# Patient Record
Sex: MALE | Race: WHITE | NOT HISPANIC OR LATINO | Employment: OTHER | URBAN - NONMETROPOLITAN AREA
[De-identification: names, ages, dates, MRNs, and addresses within clinical notes are randomized per-mention and may not be internally consistent; named-entity substitution may affect disease eponyms.]

---

## 2018-04-04 ENCOUNTER — OFFICE VISIT (OUTPATIENT)
Dept: URGENT CARE | Facility: PHYSICIAN GROUP | Age: 68
End: 2018-04-04
Payer: MEDICARE

## 2018-04-04 VITALS
SYSTOLIC BLOOD PRESSURE: 128 MMHG | HEART RATE: 70 BPM | BODY MASS INDEX: 33.11 KG/M2 | TEMPERATURE: 98.1 F | HEIGHT: 74 IN | OXYGEN SATURATION: 95 % | DIASTOLIC BLOOD PRESSURE: 82 MMHG | WEIGHT: 258 LBS | RESPIRATION RATE: 16 BRPM

## 2018-04-04 DIAGNOSIS — J98.8 RTI (RESPIRATORY TRACT INFECTION): ICD-10-CM

## 2018-04-04 PROCEDURE — 99214 OFFICE O/P EST MOD 30 MIN: CPT | Performed by: FAMILY MEDICINE

## 2018-04-04 RX ORDER — AZITHROMYCIN 250 MG/1
TABLET, FILM COATED ORAL
Qty: 6 TAB | Refills: 0 | Status: SHIPPED | OUTPATIENT
Start: 2018-04-04 | End: 2019-11-13

## 2018-04-04 RX ORDER — PROMETHAZINE HYDROCHLORIDE, PHENYLEPHRINE HYDROCHLORIDE AND CODEINE PHOSPHATE 6.25; 5; 1 MG/5ML; MG/5ML; MG/5ML
10 SOLUTION ORAL EVERY 8 HOURS PRN
Qty: 280 ML | Refills: 0 | Status: SHIPPED | OUTPATIENT
Start: 2018-04-04 | End: 2018-04-14

## 2018-04-04 ASSESSMENT — ENCOUNTER SYMPTOMS
SPUTUM PRODUCTION: 0
FEVER: 0
FOCAL WEAKNESS: 0
COUGH: 1
DIZZINESS: 0
CHILLS: 0
SINUS PAIN: 1
ORTHOPNEA: 0

## 2019-11-13 VITALS — HEIGHT: 74 IN | BODY MASS INDEX: 33.13 KG/M2

## 2019-11-13 RX ORDER — ACETAMINOPHEN 500 MG
500-1000 TABLET ORAL EVERY 6 HOURS PRN
COMMUNITY
End: 2023-10-03

## 2019-11-13 SDOH — HEALTH STABILITY: MENTAL HEALTH: HOW OFTEN DO YOU HAVE 6 OR MORE DRINKS ON ONE OCCASION?: WEEKLY

## 2019-11-13 SDOH — HEALTH STABILITY: MENTAL HEALTH: HOW MANY STANDARD DRINKS CONTAINING ALCOHOL DO YOU HAVE ON A TYPICAL DAY?: 1 OR 2

## 2019-11-13 NOTE — OR NURSING
Hx and meds reviewed, pre op instructions given. Pt aware may take the listed meds morning of surgery; tylenol if needed . Anesthesia fasting guidelines reviewed with pt.

## 2019-11-15 ENCOUNTER — ANESTHESIA EVENT (OUTPATIENT)
Dept: SURGERY | Facility: MEDICAL CENTER | Age: 69
End: 2019-11-15
Payer: MEDICARE

## 2019-11-15 ENCOUNTER — ANESTHESIA (OUTPATIENT)
Dept: SURGERY | Facility: MEDICAL CENTER | Age: 69
End: 2019-11-15
Payer: MEDICARE

## 2019-11-15 ENCOUNTER — HOSPITAL ENCOUNTER (OUTPATIENT)
Facility: MEDICAL CENTER | Age: 69
End: 2019-11-15
Attending: ORTHOPAEDIC SURGERY | Admitting: ORTHOPAEDIC SURGERY
Payer: MEDICARE

## 2019-11-15 VITALS
BODY MASS INDEX: 32.49 KG/M2 | WEIGHT: 253.09 LBS | SYSTOLIC BLOOD PRESSURE: 153 MMHG | DIASTOLIC BLOOD PRESSURE: 87 MMHG | OXYGEN SATURATION: 96 % | HEART RATE: 84 BPM | RESPIRATION RATE: 16 BRPM | TEMPERATURE: 97.5 F

## 2019-11-15 PROCEDURE — 160002 HCHG RECOVERY MINUTES (STAT): Performed by: ORTHOPAEDIC SURGERY

## 2019-11-15 PROCEDURE — 500881 HCHG PACK, EXTREMITY: Performed by: ORTHOPAEDIC SURGERY

## 2019-11-15 PROCEDURE — 700105 HCHG RX REV CODE 258: Performed by: ORTHOPAEDIC SURGERY

## 2019-11-15 PROCEDURE — 160028 HCHG SURGERY MINUTES - 1ST 30 MINS LEVEL 3: Performed by: ORTHOPAEDIC SURGERY

## 2019-11-15 PROCEDURE — 700101 HCHG RX REV CODE 250: Performed by: ORTHOPAEDIC SURGERY

## 2019-11-15 PROCEDURE — C1713 ANCHOR/SCREW BN/BN,TIS/BN: HCPCS | Performed by: ORTHOPAEDIC SURGERY

## 2019-11-15 PROCEDURE — 700111 HCHG RX REV CODE 636 W/ 250 OVERRIDE (IP): Performed by: ANESTHESIOLOGY

## 2019-11-15 PROCEDURE — 160035 HCHG PACU - 1ST 60 MINS PHASE I: Performed by: ORTHOPAEDIC SURGERY

## 2019-11-15 PROCEDURE — 160022 HCHG BLOCK: Performed by: ORTHOPAEDIC SURGERY

## 2019-11-15 PROCEDURE — A6222 GAUZE <=16 IN NO W/SAL W/O B: HCPCS | Performed by: ORTHOPAEDIC SURGERY

## 2019-11-15 PROCEDURE — 160036 HCHG PACU - EA ADDL 30 MINS PHASE I: Performed by: ORTHOPAEDIC SURGERY

## 2019-11-15 PROCEDURE — 160009 HCHG ANES TIME/MIN: Performed by: ORTHOPAEDIC SURGERY

## 2019-11-15 PROCEDURE — 160046 HCHG PACU - 1ST 60 MINS PHASE II: Performed by: ORTHOPAEDIC SURGERY

## 2019-11-15 PROCEDURE — 501838 HCHG SUTURE GENERAL: Performed by: ORTHOPAEDIC SURGERY

## 2019-11-15 PROCEDURE — A9270 NON-COVERED ITEM OR SERVICE: HCPCS | Performed by: ANESTHESIOLOGY

## 2019-11-15 PROCEDURE — 700111 HCHG RX REV CODE 636 W/ 250 OVERRIDE (IP): Performed by: ORTHOPAEDIC SURGERY

## 2019-11-15 PROCEDURE — 700102 HCHG RX REV CODE 250 W/ 637 OVERRIDE(OP): Performed by: ANESTHESIOLOGY

## 2019-11-15 PROCEDURE — 700101 HCHG RX REV CODE 250: Performed by: ANESTHESIOLOGY

## 2019-11-15 PROCEDURE — 160039 HCHG SURGERY MINUTES - EA ADDL 1 MIN LEVEL 3: Performed by: ORTHOPAEDIC SURGERY

## 2019-11-15 PROCEDURE — 160048 HCHG OR STATISTICAL LEVEL 1-5: Performed by: ORTHOPAEDIC SURGERY

## 2019-11-15 PROCEDURE — 700105 HCHG RX REV CODE 258: Performed by: ANESTHESIOLOGY

## 2019-11-15 PROCEDURE — 160025 RECOVERY II MINUTES (STATS): Performed by: ORTHOPAEDIC SURGERY

## 2019-11-15 DEVICE — SUTURE ANCHOR 2.8MM: Type: IMPLANTABLE DEVICE | Site: KNEE | Status: FUNCTIONAL

## 2019-11-15 RX ORDER — GLYCOPYRROLATE 0.2 MG/ML
INJECTION INTRAMUSCULAR; INTRAVENOUS PRN
Status: DISCONTINUED | OUTPATIENT
Start: 2019-11-15 | End: 2019-11-15 | Stop reason: SURG

## 2019-11-15 RX ORDER — IPRATROPIUM BROMIDE AND ALBUTEROL SULFATE 2.5; .5 MG/3ML; MG/3ML
3 SOLUTION RESPIRATORY (INHALATION)
Status: DISCONTINUED | OUTPATIENT
Start: 2019-11-15 | End: 2019-11-15 | Stop reason: HOSPADM

## 2019-11-15 RX ORDER — SODIUM CHLORIDE, SODIUM LACTATE, POTASSIUM CHLORIDE, CALCIUM CHLORIDE 600; 310; 30; 20 MG/100ML; MG/100ML; MG/100ML; MG/100ML
INJECTION, SOLUTION INTRAVENOUS
Status: DISCONTINUED | OUTPATIENT
Start: 2019-11-15 | End: 2019-11-15 | Stop reason: SURG

## 2019-11-15 RX ORDER — LABETALOL HYDROCHLORIDE 5 MG/ML
5 INJECTION, SOLUTION INTRAVENOUS
Status: DISCONTINUED | OUTPATIENT
Start: 2019-11-15 | End: 2019-11-15 | Stop reason: HOSPADM

## 2019-11-15 RX ORDER — BUPIVACAINE HYDROCHLORIDE AND EPINEPHRINE 5; 5 MG/ML; UG/ML
INJECTION, SOLUTION EPIDURAL; INTRACAUDAL; PERINEURAL
Status: COMPLETED | OUTPATIENT
Start: 2019-11-15 | End: 2019-11-15

## 2019-11-15 RX ORDER — ROCURONIUM BROMIDE 10 MG/ML
INJECTION, SOLUTION INTRAVENOUS PRN
Status: DISCONTINUED | OUTPATIENT
Start: 2019-11-15 | End: 2019-11-15 | Stop reason: SURG

## 2019-11-15 RX ORDER — OXYCODONE HCL 5 MG/5 ML
10 SOLUTION, ORAL ORAL
Status: COMPLETED | OUTPATIENT
Start: 2019-11-15 | End: 2019-11-15

## 2019-11-15 RX ORDER — HYDROMORPHONE HYDROCHLORIDE 1 MG/ML
0.1 INJECTION, SOLUTION INTRAMUSCULAR; INTRAVENOUS; SUBCUTANEOUS
Status: DISCONTINUED | OUTPATIENT
Start: 2019-11-15 | End: 2019-11-15 | Stop reason: HOSPADM

## 2019-11-15 RX ORDER — HYDROMORPHONE HYDROCHLORIDE 1 MG/ML
0.4 INJECTION, SOLUTION INTRAMUSCULAR; INTRAVENOUS; SUBCUTANEOUS
Status: DISCONTINUED | OUTPATIENT
Start: 2019-11-15 | End: 2019-11-15 | Stop reason: HOSPADM

## 2019-11-15 RX ORDER — MEPERIDINE HYDROCHLORIDE 25 MG/ML
12.5 INJECTION INTRAMUSCULAR; INTRAVENOUS; SUBCUTANEOUS
Status: DISCONTINUED | OUTPATIENT
Start: 2019-11-15 | End: 2019-11-15 | Stop reason: HOSPADM

## 2019-11-15 RX ORDER — DEXAMETHASONE SODIUM PHOSPHATE 4 MG/ML
INJECTION, SOLUTION INTRA-ARTICULAR; INTRALESIONAL; INTRAMUSCULAR; INTRAVENOUS; SOFT TISSUE
Status: COMPLETED | OUTPATIENT
Start: 2019-11-15 | End: 2019-11-15

## 2019-11-15 RX ORDER — SODIUM CHLORIDE, SODIUM LACTATE, POTASSIUM CHLORIDE, CALCIUM CHLORIDE 600; 310; 30; 20 MG/100ML; MG/100ML; MG/100ML; MG/100ML
INJECTION, SOLUTION INTRAVENOUS CONTINUOUS
Status: DISCONTINUED | OUTPATIENT
Start: 2019-11-15 | End: 2019-11-15 | Stop reason: HOSPADM

## 2019-11-15 RX ORDER — HALOPERIDOL 5 MG/ML
1 INJECTION INTRAMUSCULAR
Status: DISCONTINUED | OUTPATIENT
Start: 2019-11-15 | End: 2019-11-15 | Stop reason: HOSPADM

## 2019-11-15 RX ORDER — OXYCODONE HCL 5 MG/5 ML
5 SOLUTION, ORAL ORAL
Status: COMPLETED | OUTPATIENT
Start: 2019-11-15 | End: 2019-11-15

## 2019-11-15 RX ORDER — HYDRALAZINE HYDROCHLORIDE 20 MG/ML
5 INJECTION INTRAMUSCULAR; INTRAVENOUS
Status: DISCONTINUED | OUTPATIENT
Start: 2019-11-15 | End: 2019-11-15 | Stop reason: HOSPADM

## 2019-11-15 RX ORDER — HYDROMORPHONE HYDROCHLORIDE 1 MG/ML
0.2 INJECTION, SOLUTION INTRAMUSCULAR; INTRAVENOUS; SUBCUTANEOUS
Status: DISCONTINUED | OUTPATIENT
Start: 2019-11-15 | End: 2019-11-15 | Stop reason: HOSPADM

## 2019-11-15 RX ORDER — LIDOCAINE HYDROCHLORIDE 20 MG/ML
INJECTION, SOLUTION EPIDURAL; INFILTRATION; INTRACAUDAL; PERINEURAL PRN
Status: DISCONTINUED | OUTPATIENT
Start: 2019-11-15 | End: 2019-11-15 | Stop reason: SURG

## 2019-11-15 RX ORDER — NEOSTIGMINE METHYLSULFATE 1 MG/ML
INJECTION, SOLUTION INTRAVENOUS PRN
Status: DISCONTINUED | OUTPATIENT
Start: 2019-11-15 | End: 2019-11-15 | Stop reason: SURG

## 2019-11-15 RX ORDER — DIPHENHYDRAMINE HYDROCHLORIDE 50 MG/ML
12.5 INJECTION INTRAMUSCULAR; INTRAVENOUS
Status: DISCONTINUED | OUTPATIENT
Start: 2019-11-15 | End: 2019-11-15 | Stop reason: HOSPADM

## 2019-11-15 RX ORDER — MIDAZOLAM HYDROCHLORIDE 1 MG/ML
1 INJECTION INTRAMUSCULAR; INTRAVENOUS
Status: DISCONTINUED | OUTPATIENT
Start: 2019-11-15 | End: 2019-11-15 | Stop reason: HOSPADM

## 2019-11-15 RX ORDER — CEFAZOLIN SODIUM 1 G/3ML
INJECTION, POWDER, FOR SOLUTION INTRAMUSCULAR; INTRAVENOUS PRN
Status: DISCONTINUED | OUTPATIENT
Start: 2019-11-15 | End: 2019-11-15 | Stop reason: SURG

## 2019-11-15 RX ORDER — ONDANSETRON 2 MG/ML
4 INJECTION INTRAMUSCULAR; INTRAVENOUS
Status: DISCONTINUED | OUTPATIENT
Start: 2019-11-15 | End: 2019-11-15 | Stop reason: HOSPADM

## 2019-11-15 RX ORDER — ROPIVACAINE HYDROCHLORIDE 5 MG/ML
INJECTION, SOLUTION EPIDURAL; INFILTRATION; PERINEURAL
Status: DISCONTINUED | OUTPATIENT
Start: 2019-11-15 | End: 2019-11-15 | Stop reason: HOSPADM

## 2019-11-15 RX ADMIN — ROCURONIUM BROMIDE 50 MG: 10 INJECTION, SOLUTION INTRAVENOUS at 13:30

## 2019-11-15 RX ADMIN — NEOSTIGMINE METHYLSULFATE 3 MG: 1 INJECTION INTRAVENOUS at 14:30

## 2019-11-15 RX ADMIN — CEFAZOLIN 2 G: 1 INJECTION, POWDER, FOR SOLUTION INTRAVENOUS at 13:33

## 2019-11-15 RX ADMIN — OXYCODONE HYDROCHLORIDE 5 MG: 5 SOLUTION ORAL at 15:17

## 2019-11-15 RX ADMIN — BUPIVACAINE HYDROCHLORIDE AND EPINEPHRINE BITARTRATE 30 ML: 5; .0091 INJECTION, SOLUTION EPIDURAL; INTRACAUDAL; PERINEURAL at 14:45

## 2019-11-15 RX ADMIN — LIDOCAINE HYDROCHLORIDE 0.5 ML: 10 INJECTION, SOLUTION INFILTRATION; PERINEURAL at 12:48

## 2019-11-15 RX ADMIN — DEXAMETHASONE SODIUM PHOSPHATE 2 MG: 4 INJECTION, SOLUTION INTRAMUSCULAR; INTRAVENOUS at 14:45

## 2019-11-15 RX ADMIN — SODIUM CHLORIDE, POTASSIUM CHLORIDE, SODIUM LACTATE AND CALCIUM CHLORIDE: 600; 310; 30; 20 INJECTION, SOLUTION INTRAVENOUS at 13:28

## 2019-11-15 RX ADMIN — PROPOFOL 200 MG: 10 INJECTION, EMULSION INTRAVENOUS at 13:30

## 2019-11-15 RX ADMIN — GLYCOPYRROLATE 0.4 MG: 0.2 INJECTION, SOLUTION INTRAMUSCULAR; INTRAVENOUS at 14:30

## 2019-11-15 RX ADMIN — SODIUM CHLORIDE, POTASSIUM CHLORIDE, SODIUM LACTATE AND CALCIUM CHLORIDE: 600; 310; 30; 20 INJECTION, SOLUTION INTRAVENOUS at 12:45

## 2019-11-15 RX ADMIN — LIDOCAINE HYDROCHLORIDE 100 MG: 20 INJECTION, SOLUTION EPIDURAL; INFILTRATION; INTRACAUDAL; PERINEURAL at 13:30

## 2019-11-15 RX ADMIN — FENTANYL CITRATE 150 MCG: 50 INJECTION, SOLUTION INTRAMUSCULAR; INTRAVENOUS at 14:47

## 2019-11-15 NOTE — ANESTHESIA PROCEDURE NOTES
Airway  Date/Time: 11/15/2019 1:31 PM  Performed by: Carlo Gomez M.D.  Authorized by: Carlo Gomez M.D.     Location:  OR  Urgency:  Elective  Difficult Airway: No    Indications for Airway Management:  Anesthesia  Spontaneous Ventilation: absent    Sedation Level:  Deep  Preoxygenated: Yes    Mask Difficulty Assessment:  1 - vent by mask  Final Airway Type:  Supraglottic airway  Final Supraglottic Airway:  Standard LMA  SGA Size:  5  Number of Attempts at Approach:  1

## 2019-11-15 NOTE — ANESTHESIA POSTPROCEDURE EVALUATION
Patient: Eric Santana    Procedure Summary     Date:  11/15/19 Room / Location:   OR  / SURGERY [facility]    Anesthesia Start:  1328 Anesthesia Stop:  1457    Procedure:  REPAIR, TENDON - FOR KNEE OPEN QUADRICEPS TENDON REPAIR AND FERNANDEZ (Right Knee) Diagnosis:  (KNEE PAIN RIGHT, RUPTURE QUADRICEPS TENDON)    Surgeon:  Beka Marr M.D. Responsible Provider:  Carlo Gomez M.D.    Anesthesia Type:  general ASA Status:  1          Final Anesthesia Type: general  Last vitals  BP   Blood Pressure : 160/85    Temp   36 °C (96.8 °F)    Pulse   Pulse: 77   Resp   12    SpO2   100 %      Anesthesia Post Evaluation    Patient location during evaluation: PACU  Patient participation: complete - patient participated  Level of consciousness: awake and alert    Airway patency: patent  Anesthetic complications: no  Cardiovascular status: hemodynamically stable  Respiratory status: acceptable  Hydration status: euvolemic    PONV: none           Nurse Pain Score: 8 (NPRS)

## 2019-11-15 NOTE — ANESTHESIA TIME REPORT
Anesthesia Start and Stop Event Times     Date Time Event    11/15/2019 1324 Ready for Procedure     1328 Anesthesia Start     1457 Anesthesia Stop        Responsible Staff  11/15/19    Name Role Begin End    Carlo Gomez M.D. Anesth 1328 1457        Preop Diagnosis (Free Text):  Pre-op Diagnosis     KNEE PAIN RIGHT, RUPTURE QUADRICEPS TENDON        Preop Diagnosis (Codes):    Post op Diagnosis  Rupture of quadriceps tendon      Premium Reason  Non-Premium    Comments:

## 2019-11-15 NOTE — OR NURSING
1545 into stage 2, awake alert, rt leg ace wrapped, able to wiggle toes and able to feel feet, cap refill q 3 sec return, states pain tolerable at this time. Able to assist with dressing. Assessment unchanged from my pre op assess other than rt leg aftercare .   1605 discharge instructions given to pt and spouse, pt up to br and able to utilize crutches proficiently to bathroom. Tolerated well. Meets criteria for dc stage 2.

## 2019-11-15 NOTE — ANESTHESIA PROCEDURE NOTES
Peripheral Block  Date/Time: 11/15/2019 2:45 PM  Performed by: Carlo Gomez M.D.  Authorized by: Carlo Gomez M.D.     Patient Location:  Post-op  Start Time:  11/15/2019 2:45 PM  End Time:  11/15/2019 2:50 PM  Reason for Block: at surgeon's request and post-op pain management    patient identified, IV checked, site marked, risks and benefits discussed, surgical consent, monitors and equipment checked, pre-op evaluation and timeout performed    Patient Position:  Supine  Prep: ChloraPrep    Monitoring:  Heart rate, continuous pulse ox and cardiac monitor  Block Region:  Lower Extremity  Lower Extremity - Block Type:  FEMORAL nerve block, Infra-Inguinal approach    Laterality:  Right  Procedures: ultrasound guided  Image captured, interpreted and electronically stored.  Local Infiltration:  Lidocaine  Strength:  1 %  Dose:  3 ml  Block Type:  Single-shot  Needle Length:  100mm  Needle Gauge:  21 G  Needle Localization:  Ultrasound guidance  Injection Assessment:  Negative aspiration for heme, no paresthesia on injection, incremental injection and local visualized surrounding nerve on ultrasound  Evidence of intravascular injection: No

## 2019-11-15 NOTE — OR NURSING
Into pre op, educated on pre op procedures and schedule of events for this day   1310 tolerated all pre op procedures well without incident. Spouse left to go eat lunch. Pt has brace at bedside for aftercare.

## 2019-11-15 NOTE — DISCHARGE INSTRUCTIONS
ACTIVITY: Rest and take it easy for the first 24 hours.  A responsible adult is recommended to remain with you during that time.  It is normal to feel sleepy.  We encourage you to not do anything that requires balance, judgment or coordination.    MILD FLU-LIKE SYMPTOMS ARE NORMAL. YOU MAY EXPERIENCE GENERALIZED MUSCLE ACHES, THROAT IRRITATION, HEADACHE AND/OR SOME NAUSEA.    FOR 24 HOURS DO NOT:  Drive, operate machinery or run household appliances.  Drink beer or alcoholic beverages.   Make important decisions or sign legal documents.    SPECIAL INSTRUCTIONS: Toe touch weight bearing with crutches. May shower with dressing covered.     DIET: To avoid nausea, slowly advance diet as tolerated, avoiding spicy or greasy foods for the first day.  Add more substantial food to your diet according to your physician's instructions.  Babies can be fed formula or breast milk as soon as they are hungry.  INCREASE FLUIDS AND FIBER TO AVOID CONSTIPATION.        FOLLOW-UP APPOINTMENT:  A follow-up appointment should be arranged with your doctor ; call to schedule.    You should CALL YOUR PHYSICIAN if you develop:  Fever greater than 101 degrees F.  Pain not relieved by medication, or persistent nausea or vomiting.  Excessive bleeding (blood soaking through dressing) or unexpected drainage from the wound.  Extreme redness or swelling around the incision site, drainage of pus or foul smelling drainage.  Inability to urinate or empty your bladder within 8 hours.  Problems with breathing or chest pain.    You should call 911 if you develop problems with breathing or chest pain.  If you are unable to contact your doctor or surgical center, you should go to the nearest emergency room or urgent care center.  Physician's telephone #: Dr Marr [phone]    If any questions arise, call your doctor.  If your doctor is not available, please feel free to call the Surgical Center at [phone].  The Center is open Monday through  Friday from 7AM to 7PM.  You can also call the HEALTH HOTLINE open 24 hours/day, 7 days/week and speak to a nurse at [phone], or toll free at [phone].    A registered nurse may call you a few days after your surgery to see how you are doing after your procedure.    MEDICATIONS: Resume taking daily medication.  Take prescribed pain medication with food.  If no medication is prescribed, you may take non-aspirin pain medication if needed.  PAIN MEDICATION CAN BE VERY CONSTIPATING.  Take a stool softener or laxative such as senokot, pericolace, or milk of magnesia if needed.    Prescription given to wife .  Last pain medication given at 3:17 pm .    If your physician has prescribed pain medication that includes Acetaminophen (Tylenol), do not take additional Acetaminophen (Tylenol) while taking the prescribed medication.    Depression / Suicide Risk    As you are discharged from this [facility] facility, it is important to learn how to keep safe from harming yourself.    Recognize the warning signs:  · Abrupt changes in personality, positive or negative- including increase in energy   · Giving away possessions  · Change in eating patterns- significant weight changes-  positive or negative  · Change in sleeping patterns- unable to sleep or sleeping all the time   · Unwillingness or inability to communicate  · Depression  · Unusual sadness, discouragement and loneliness  · Talk of wanting to die  · Neglect of personal appearance   · Rebelliousness- reckless behavior  · Withdrawal from people/activities they love  · Confusion- inability to concentrate     If you or a loved one observes any of these behaviors or has concerns about self-harm, here's what you can do:  · Talk about it- your feelings and reasons for harming yourself  · Remove any means that you might use to hurt yourself (examples: pills, rope, extension cords, firearm)  · Get professional help from the community (Mental Health, Substance  "Abuse, psychological counseling)  · Do not be alone:Call your Safe Contact- someone whom you trust who will be there for you.  · Call your local CRISIS HOTLINE 479-9405 or 784-647-2707  · Call your local Children's Mobile Crisis Response Team [place] (521) 192-1386 or www.Citelighter  · Call the toll free National Suicide Prevention Hotlines   · National Suicide Prevention Lifeline 078-306-TOZD (4802)  [facility] 800-SUICIDE (513-5150)    Peripheral Nerve Block Discharge Instructions from Same Day Surgery and Inpatient :    What to Expect - Lower Extremity  · The block may cause you to experience numbness and weakness in your hip and thigh, thigh and knee or calf and foot on the same side as your surgery  · Numbness, tingling and / or weakness are all normal. For some people, this may be an unpleasant sensation  · These issues will be resolved when the local anesthetic wears off   · You may experience numbness and tingling in your thigh on the same side as your surgery if the block medicine was injected at your groin area  · Numbness will make it difficult to walk  · You may have problems with balance and walking so be very careful   · Follow your surgeon's direction regarding weight bearing on your surgical limb  · Be very careful with your numb limb  Precautions  · The numbness may affect your balance  · Be careful when walking or moving around  · Your leg may be weak: be very careful putting weight on it  · If your surgeon did not specify a time, you should not bear weight for 24 hours  · Be sure to ask for help when you need it  · It is better to have help than to fall and hurt yourself  Pain Control  · The initial block on the day of surgery will make your extremity feel \"numb\"  · Any consecutive injection including prior to discharge from the hospital will make your extremity feel \"numb\"  · You may feel an aching or burning when the local anesthesia starts to wear off  · Take pain " pills as prescribed by your surgeon  · Call your surgeon or anesthesiologist if you do not have adequate pain control  ·

## 2019-11-15 NOTE — OR NURSING
1436 To PACU from OR via gurney, respirations spontaneous and non-labored. VSS. Icepack applied over c/d/i right knee surgical dressings. Cap refill < 3 seconds to RLE. Pt moaning. States pain is 9/10.   1440 Dr. Gomez at bedside to perform block. Time out performed. Pts VSS   1455 Pt states pain is not tolerable at this time. Pt denies nausea. Plan to give block   1515 Pt c/o 6/10 pain still at this time. VSS. Plan to medicate. See MAR   1530 No change   1545 Pt meets criteria for stage two. VSS. Pt states pain is tolerable and denies nausea. Report to Ce FAUSTIN

## 2019-11-16 NOTE — OP REPORT
DATE OF SERVICE:  11/15/2019    SURGEON:  Beka Marr MD    ASSISTANT:  None.    ANESTHESIOLOGIST:  Carlo Gomez MD    ANESTHESIA:  General anesthesia with single shot femoral nerve block.    PREOPERATIVE DIAGNOSIS:  Right quadriceps tendon rupture.    POSTOPERATIVE DIAGNOSIS:  Right quadriceps tendon rupture.    PROCEDURE PERFORMED:  Right open quadriceps tendon repair.    IMPLANTS:  Gusman and 2.8 mm Q-Fix anchor x2.    HISTORY OF PRESENT ILLNESS:  The patient is a very pleasant active 69-year-old   male who has injured his right knee several days ago.  His physical exam and   MRI demonstrated a complete tear of the quadriceps tendon.  We discussed   surgical intervention.  The patient has been n.p.o. since midnight.  He is   medically cleared by the anesthesia team.    INFORMED CONSENT:  The patient was informed of the risks, benefits, and   alternatives to planned operation.  The risks include, but not limited to   bleeding, infection, neurovascular damage, recurrent quadriceps tendon tear,   pain, stiffness, DVT, PE, MI, stroke, and death.  Advanced directives were   reviewed.  After answering all questions, the patient elected to proceed with   planned operation.  Informed consent form was singed.    DESCRIPTION OF PROCEDURE:  The patient was identified in the preoperative   holding area.  The correct procedural side and site were identified and   marked.  The patient was then brought to the operating room and transferred to   the operating table.  All bony prominences were padded.  He underwent a   general anesthesia.  A tourniquet was applied to the right upper thigh.    The right knee was then cleaned with several alcohol-soaked gauzes.  The right   lower extremity was then prepped and draped in normal standard sterile   fashion.    A procedural pause was then performed by the operating room team.  The   procedure, the patient's identity, operative side, surgical site, and the   procedure to be  performed were all verified.  Patient was given IV antibiotics   prior to incision.    The right lower extremity was elevated and tourniquet inflated to 250 mmHg.    An approximately 5-6 cm longitudinal incision was made directly over the   distal quadriceps tendon and the superior pole of the patella.  Dissection was   carried down and the overlying fascia was incised.  Immediately noted an   egress of hematoma and synovial fluid.  At this point in time, he had a   complete tear of the quadriceps tendon off the superior pole of the patella.    The tear was relatively clean and only minimal retinacular tissue on the   medial right angular site was disrupted.  I then inspected the joint, I did   note some grade II changes within the patellofemoral compartment.  The joint   was then copiously irrigated with normal saline.  I then debrided all   remaining tendon off of the superior pole of the patella and the superior pole   of the patella was debrided to a healthy bed of bleeding bone.  I then placed   2 Smith and Nephew 2.8 mm keep Q-Fix anchors on the superior pole of the   patella.  I then secured the distal quadriceps tendon with a single Rome   stitch and single horizontal mattress stitch from each anchor.  The sutures   were then sequentially tied with the knee in full extension.  There was   excellent reduction of the quadriceps tendon back to his footprint on the   superior pole of the patella.  I then closed gently bent the knee to about   35-40 degrees and noted no significant gapping of the repair site.  I then   repaired the medial and lateral retinacular tears with interrupted #2   FiberWire sutures.  The tourniquet was then released with total tourniquet   time of 28 minutes.  Wound was then copiously irrigated.  Meticulous   hemostasis was obtained.  The wound was closed in layered fashion with   interrupted 2-0 Monocryl, followed by staples.  A sterile compressive dressing   was then applied followed  by a SONJA hose stocking and hinged knee brace locked   in full extension.    Needle and sponge counts were correct at the end of the procedure by the   circulating nurse.  The patient has no complications.  The patient was then   transferred off the operating room table onto the hospital bed.  He was   extubated by the anesthesia team.    ESTIMATED BLOOD LOSS:  25 mL    COMPLICATIONS:  None.    TOURNIQUET TIME:  28 minutes.    SPECIMENS:  None.    WOUND TYPE:  Type 1, clean.    POSTOPERATIVE PLAN:  The patient will be transferred back to the postoperative   unit.  I expect he will be discharged from the hospital later this afternoon   once mobilizing safely and tolerating all medications.  He will remain   touchdown weightbearing on the right lower extremity with the use of crutches   and a brace in place locked in full extension.  We will begin some physical   therapy in the next 7-10 days.  The patient will take aspirin for   pharmacological DVT prophylaxis.       ____________________________________     MD LARISSA Norris / AMARIS    DD:  11/15/2019 14:39:02  DT:  11/15/2019 16:29:45    D#:  [id number]  Job#:  [id number]

## 2023-10-03 ENCOUNTER — HOSPITAL ENCOUNTER (EMERGENCY)
Facility: MEDICAL CENTER | Age: 73
End: 2023-10-03
Attending: EMERGENCY MEDICINE | Admitting: HOSPITALIST
Payer: MEDICARE

## 2023-10-03 ENCOUNTER — APPOINTMENT (OUTPATIENT)
Dept: RADIOLOGY | Facility: MEDICAL CENTER | Age: 73
End: 2023-10-03
Attending: EMERGENCY MEDICINE
Payer: MEDICARE

## 2023-10-03 ENCOUNTER — OFFICE VISIT (OUTPATIENT)
Dept: URGENT CARE | Facility: CLINIC | Age: 73
End: 2023-10-03
Payer: MEDICARE

## 2023-10-03 ENCOUNTER — APPOINTMENT (OUTPATIENT)
Dept: RADIOLOGY | Facility: IMAGING CENTER | Age: 73
End: 2023-10-03
Attending: PHYSICIAN ASSISTANT
Payer: MEDICARE

## 2023-10-03 VITALS
HEART RATE: 70 BPM | RESPIRATION RATE: 20 BRPM | HEIGHT: 74 IN | TEMPERATURE: 98.2 F | DIASTOLIC BLOOD PRESSURE: 78 MMHG | OXYGEN SATURATION: 99 % | WEIGHT: 252.6 LBS | BODY MASS INDEX: 32.42 KG/M2 | SYSTOLIC BLOOD PRESSURE: 130 MMHG

## 2023-10-03 VITALS
WEIGHT: 251.99 LBS | RESPIRATION RATE: 17 BRPM | HEART RATE: 52 BPM | DIASTOLIC BLOOD PRESSURE: 81 MMHG | SYSTOLIC BLOOD PRESSURE: 148 MMHG | HEIGHT: 74 IN | OXYGEN SATURATION: 96 % | TEMPERATURE: 98 F | BODY MASS INDEX: 32.34 KG/M2

## 2023-10-03 DIAGNOSIS — R07.89 RIGHT-SIDED CHEST WALL PAIN: ICD-10-CM

## 2023-10-03 DIAGNOSIS — R55 SYNCOPE, UNSPECIFIED SYNCOPE TYPE: ICD-10-CM
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
## 2023-10-03 DIAGNOSIS — S20.211A CONTUSION OF RIGHT CHEST WALL, INITIAL ENCOUNTER: ICD-10-CM
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
## 2023-10-03 LAB
ANION GAP SERPL CALC-SCNC: 9 MMOL/L (ref 7–16)
BASOPHILS # BLD AUTO: 0.7 % (ref 0–1.8)
BASOPHILS # BLD: 0.04 K/UL (ref 0–0.12)
BUN SERPL-MCNC: 21 MG/DL (ref 8–22)
CALCIUM SERPL-MCNC: 9.2 MG/DL (ref 8.5–10.5)
CHLORIDE SERPL-SCNC: 107 MMOL/L (ref 96–112)
CO2 SERPL-SCNC: 23 MMOL/L (ref 20–33)
CREAT SERPL-MCNC: 1.06 MG/DL (ref 0.5–1.4)
EKG IMPRESSION: NORMAL
EOSINOPHIL # BLD AUTO: 0.08 K/UL (ref 0–0.51)
EOSINOPHIL NFR BLD: 1.4 % (ref 0–6.9)
ERYTHROCYTE [DISTWIDTH] IN BLOOD BY AUTOMATED COUNT: 45 FL (ref 35.9–50)
GFR SERPLBLD CREATININE-BSD FMLA CKD-EPI: 75 ML/MIN/1.73 M 2
GLUCOSE SERPL-MCNC: 87 MG/DL (ref 65–99)
HCT VFR BLD AUTO: 42.4 % (ref 42–52)
HGB BLD-MCNC: 14 G/DL (ref 14–18)
IMM GRANULOCYTES # BLD AUTO: 0.01 K/UL (ref 0–0.11)
IMM GRANULOCYTES NFR BLD AUTO: 0.2 % (ref 0–0.9)
LYMPHOCYTES # BLD AUTO: 1.98 K/UL (ref 1–4.8)
LYMPHOCYTES NFR BLD: 34.4 % (ref 22–41)
MCH RBC QN AUTO: 30.3 PG (ref 27–33)
MCHC RBC AUTO-ENTMCNC: 33 G/DL (ref 32.3–36.5)
MCV RBC AUTO: 91.8 FL (ref 81.4–97.8)
MONOCYTES # BLD AUTO: 0.57 K/UL (ref 0–0.85)
MONOCYTES NFR BLD AUTO: 9.9 % (ref 0–13.4)
NEUTROPHILS # BLD AUTO: 3.07 K/UL (ref 1.82–7.42)
NEUTROPHILS NFR BLD: 53.4 % (ref 44–72)
NRBC # BLD AUTO: 0 K/UL
NRBC BLD-RTO: 0 /100 WBC (ref 0–0.2)
PLATELET # BLD AUTO: 180 K/UL (ref 164–446)
PMV BLD AUTO: 10.2 FL (ref 9–12.9)
POTASSIUM SERPL-SCNC: 4.1 MMOL/L (ref 3.6–5.5)
RBC # BLD AUTO: 4.62 M/UL (ref 4.7–6.1)
SODIUM SERPL-SCNC: 139 MMOL/L (ref 135–145)
TROPONIN T SERPL-MCNC: 17 NG/L (ref 6–19)
WBC # BLD AUTO: 5.8 K/UL (ref 4.8–10.8)

## 2023-10-03 PROCEDURE — 36415 COLL VENOUS BLD VENIPUNCTURE: CPT

## 2023-10-03 PROCEDURE — 71045 X-RAY EXAM CHEST 1 VIEW: CPT

## 2023-10-03 PROCEDURE — 71101 X-RAY EXAM UNILAT RIBS/CHEST: CPT | Mod: TC,RT | Performed by: PHYSICIAN ASSISTANT

## 2023-10-03 PROCEDURE — 93000 ELECTROCARDIOGRAM COMPLETE: CPT | Performed by: PHYSICIAN ASSISTANT

## 2023-10-03 PROCEDURE — 93005 ELECTROCARDIOGRAM TRACING: CPT | Performed by: EMERGENCY MEDICINE

## 2023-10-03 PROCEDURE — 80048 BASIC METABOLIC PNL TOTAL CA: CPT

## 2023-10-03 PROCEDURE — 3078F DIAST BP <80 MM HG: CPT | Performed by: PHYSICIAN ASSISTANT

## 2023-10-03 PROCEDURE — 99204 OFFICE O/P NEW MOD 45 MIN: CPT | Performed by: PHYSICIAN ASSISTANT

## 2023-10-03 PROCEDURE — 99284 EMERGENCY DEPT VISIT MOD MDM: CPT | Performed by: HOSPITALIST

## 2023-10-03 PROCEDURE — 99285 EMERGENCY DEPT VISIT HI MDM: CPT

## 2023-10-03 PROCEDURE — 3075F SYST BP GE 130 - 139MM HG: CPT | Performed by: PHYSICIAN ASSISTANT

## 2023-10-03 PROCEDURE — 84484 ASSAY OF TROPONIN QUANT: CPT

## 2023-10-03 PROCEDURE — 85025 COMPLETE CBC W/AUTO DIFF WBC: CPT

## 2023-10-03 RX ORDER — VITAMIN E 200 UNIT
2 CAPSULE ORAL
COMMUNITY

## 2023-10-03 RX ORDER — IBUPROFEN 200 MG
400 TABLET ORAL EVERY 6 HOURS PRN
Status: SHIPPED | COMMUNITY
End: 2023-10-06

## 2023-10-03 ASSESSMENT — ENCOUNTER SYMPTOMS
HEADACHES: 0
PALPITATIONS: 0
DIARRHEA: 0
FEVER: 0
LOSS OF CONSCIOUSNESS: 1
FEVER: 0
MYALGIAS: 1
HEADACHES: 0
FOCAL WEAKNESS: 0
SEIZURES: 0
CHILLS: 0
VOMITING: 0
CHILLS: 0
SHORTNESS OF BREATH: 0
DIZZINESS: 0
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
## 2023-10-03 NOTE — ED NOTES
PIV placed. Labs collected by RN and sent to lab. EKG performed, signed by ERP and placed in pt chart.
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
## 2023-10-04 SDOH — ECONOMIC STABILITY: HOUSING INSECURITY
IN THE LAST 12 MONTHS, WAS THERE A TIME WHEN YOU DID NOT HAVE A STEADY PLACE TO SLEEP OR SLEPT IN A SHELTER (INCLUDING NOW)?: NO

## 2023-10-04 SDOH — ECONOMIC STABILITY: FOOD INSECURITY: WITHIN THE PAST 12 MONTHS, YOU WORRIED THAT YOUR FOOD WOULD RUN OUT BEFORE YOU GOT MONEY TO BUY MORE.: NEVER TRUE

## 2023-10-04 SDOH — ECONOMIC STABILITY: INCOME INSECURITY: IN THE LAST 12 MONTHS, WAS THERE A TIME WHEN YOU WERE NOT ABLE TO PAY THE MORTGAGE OR RENT ON TIME?: NO

## 2023-10-04 SDOH — ECONOMIC STABILITY: TRANSPORTATION INSECURITY
IN THE PAST 12 MONTHS, HAS LACK OF TRANSPORTATION KEPT YOU FROM MEETINGS, WORK, OR FROM GETTING THINGS NEEDED FOR DAILY LIVING?: NO

## 2023-10-04 SDOH — HEALTH STABILITY: PHYSICAL HEALTH: ON AVERAGE, HOW MANY DAYS PER WEEK DO YOU ENGAGE IN MODERATE TO STRENUOUS EXERCISE (LIKE A BRISK WALK)?: 3 DAYS

## 2023-10-04 SDOH — ECONOMIC STABILITY: TRANSPORTATION INSECURITY
IN THE PAST 12 MONTHS, HAS LACK OF RELIABLE TRANSPORTATION KEPT YOU FROM MEDICAL APPOINTMENTS, MEETINGS, WORK OR FROM GETTING THINGS NEEDED FOR DAILY LIVING?: NO

## 2023-10-04 SDOH — ECONOMIC STABILITY: HOUSING INSECURITY: IN THE LAST 12 MONTHS, HOW MANY PLACES HAVE YOU LIVED?: 1

## 2023-10-04 SDOH — ECONOMIC STABILITY: INCOME INSECURITY: HOW HARD IS IT FOR YOU TO PAY FOR THE VERY BASICS LIKE FOOD, HOUSING, MEDICAL CARE, AND HEATING?: NOT VERY HARD

## 2023-10-04 SDOH — ECONOMIC STABILITY: TRANSPORTATION INSECURITY
IN THE PAST 12 MONTHS, HAS THE LACK OF TRANSPORTATION KEPT YOU FROM MEDICAL APPOINTMENTS OR FROM GETTING MEDICATIONS?: NO

## 2023-10-04 SDOH — ECONOMIC STABILITY: FOOD INSECURITY: WITHIN THE PAST 12 MONTHS, THE FOOD YOU BOUGHT JUST DIDN'T LAST AND YOU DIDN'T HAVE MONEY TO GET MORE.: NEVER TRUE

## 2023-10-04 SDOH — HEALTH STABILITY: PHYSICAL HEALTH: ON AVERAGE, HOW MANY MINUTES DO YOU ENGAGE IN EXERCISE AT THIS LEVEL?: 50 MIN

## 2023-10-04 SDOH — HEALTH STABILITY: MENTAL HEALTH
STRESS IS WHEN SOMEONE FEELS TENSE, NERVOUS, ANXIOUS, OR CAN'T SLEEP AT NIGHT BECAUSE THEIR MIND IS TROUBLED. HOW STRESSED ARE YOU?: NOT AT ALL

## 2023-10-04 ASSESSMENT — LIFESTYLE VARIABLES
HOW OFTEN DO YOU HAVE A DRINK CONTAINING ALCOHOL: 2-4 TIMES A MONTH
HOW OFTEN DO YOU HAVE SIX OR MORE DRINKS ON ONE OCCASION: NEVER
SKIP TO QUESTIONS 9-10: 1
AUDIT-C TOTAL SCORE: 2
HOW MANY STANDARD DRINKS CONTAINING ALCOHOL DO YOU HAVE ON A TYPICAL DAY: 1 OR 2

## 2023-10-04 ASSESSMENT — SOCIAL DETERMINANTS OF HEALTH (SDOH)
HOW OFTEN DO YOU ATTEND CHURCH OR RELIGIOUS SERVICES?: MORE THAN 4 TIMES PER YEAR
IN A TYPICAL WEEK, HOW MANY TIMES DO YOU TALK ON THE PHONE WITH FAMILY, FRIENDS, OR NEIGHBORS?: ONCE A WEEK
IN A TYPICAL WEEK, HOW MANY TIMES DO YOU TALK ON THE PHONE WITH FAMILY, FRIENDS, OR NEIGHBORS?: ONCE A WEEK
WITHIN THE PAST 12 MONTHS, YOU WORRIED THAT YOUR FOOD WOULD RUN OUT BEFORE YOU GOT THE MONEY TO BUY MORE: NEVER TRUE
HOW OFTEN DO YOU ATTEND CHURCH OR RELIGIOUS SERVICES?: MORE THAN 4 TIMES PER YEAR
HOW OFTEN DO YOU HAVE A DRINK CONTAINING ALCOHOL: 2-4 TIMES A MONTH
HOW OFTEN DO YOU HAVE SIX OR MORE DRINKS ON ONE OCCASION: NEVER
HOW OFTEN DO YOU ATTENT MEETINGS OF THE CLUB OR ORGANIZATION YOU BELONG TO?: MORE THAN 4 TIMES PER YEAR
HOW OFTEN DO YOU ATTENT MEETINGS OF THE CLUB OR ORGANIZATION YOU BELONG TO?: MORE THAN 4 TIMES PER YEAR
HOW MANY DRINKS CONTAINING ALCOHOL DO YOU HAVE ON A TYPICAL DAY WHEN YOU ARE DRINKING: 1 OR 2
DO YOU BELONG TO ANY CLUBS OR ORGANIZATIONS SUCH AS CHURCH GROUPS UNIONS, FRATERNAL OR ATHLETIC GROUPS, OR SCHOOL GROUPS?: YES
DO YOU BELONG TO ANY CLUBS OR ORGANIZATIONS SUCH AS CHURCH GROUPS UNIONS, FRATERNAL OR ATHLETIC GROUPS, OR SCHOOL GROUPS?: YES
HOW HARD IS IT FOR YOU TO PAY FOR THE VERY BASICS LIKE FOOD, HOUSING, MEDICAL CARE, AND HEATING?: NOT VERY HARD
HOW OFTEN DO YOU GET TOGETHER WITH FRIENDS OR RELATIVES?: ONCE A WEEK
HOW OFTEN DO YOU GET TOGETHER WITH FRIENDS OR RELATIVES?: ONCE A WEEK
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
## 2023-10-04 NOTE — ED NOTES
Med rec updated and complete. Allergies reviewed. Confirmed name and date of  birth.  Pt denies taking prescription medications.      Home pharmacy  [facility]   [phone]
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
## 2023-10-06 ENCOUNTER — OFFICE VISIT (OUTPATIENT)
Dept: MEDICAL GROUP | Facility: PHYSICIAN GROUP | Age: 73
End: 2023-10-06
Payer: MEDICARE

## 2023-10-06 VITALS
HEART RATE: 60 BPM | OXYGEN SATURATION: 95 % | SYSTOLIC BLOOD PRESSURE: 118 MMHG | TEMPERATURE: 97.5 F | BODY MASS INDEX: 31.83 KG/M2 | DIASTOLIC BLOOD PRESSURE: 70 MMHG | WEIGHT: 248 LBS | HEIGHT: 74 IN

## 2023-10-06 DIAGNOSIS — Z23 NEED FOR VACCINATION: ICD-10-CM

## 2023-10-06 DIAGNOSIS — R55 SYNCOPE, UNSPECIFIED SYNCOPE TYPE: ICD-10-CM

## 2023-10-06 PROCEDURE — G0008 ADMIN INFLUENZA VIRUS VAC: HCPCS | Performed by: STUDENT IN AN ORGANIZED HEALTH CARE EDUCATION/TRAINING PROGRAM

## 2023-10-06 PROCEDURE — 3078F DIAST BP <80 MM HG: CPT | Performed by: STUDENT IN AN ORGANIZED HEALTH CARE EDUCATION/TRAINING PROGRAM

## 2023-10-06 PROCEDURE — 99213 OFFICE O/P EST LOW 20 MIN: CPT | Mod: 25 | Performed by: STUDENT IN AN ORGANIZED HEALTH CARE EDUCATION/TRAINING PROGRAM

## 2023-10-06 PROCEDURE — 90662 IIV NO PRSV INCREASED AG IM: CPT | Performed by: STUDENT IN AN ORGANIZED HEALTH CARE EDUCATION/TRAINING PROGRAM

## 2023-10-06 PROCEDURE — 3074F SYST BP LT 130 MM HG: CPT | Performed by: STUDENT IN AN ORGANIZED HEALTH CARE EDUCATION/TRAINING PROGRAM

## 2023-10-06 ASSESSMENT — ENCOUNTER SYMPTOMS
FEVER: 0
SHORTNESS OF BREATH: 0
CHILLS: 0
PALPITATIONS: 0

## 2023-10-06 ASSESSMENT — PATIENT HEALTH QUESTIONNAIRE - PHQ9: CLINICAL INTERPRETATION OF PHQ2 SCORE: 0
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
## 2023-10-06 NOTE — LETTER
[facility]  Sera Burton M.D.  [address] [address]  Fax: [phone]   Authorization for Release/Disclosure of   Protected Health Information   Name: ERIC SANTANA : 1950 SSN: [id number]   Address: [address] [place] [address] Phone:    [phone] (home)    I authorize the entity listed below to release/disclose the PHI below to:   [facility]/Sera Burton M.D. and Sera Burton M.D.   Provider or Entity Name: Dr. Lagunas     Address   [address], [address] [place] [address]   Phone:      Fax:     Reason for request: continuity of care   Information to be released:    [  ] LAST COLONOSCOPY,  including any PATH REPORT and follow-up  [  ] LAST FIT/COLOGUARD RESULT [  ] LAST DEXA  [  ] LAST MAMMOGRAM  [  ] LAST PAP  [  ] LAST LABS [  ] RETINA EXAM REPORT  [  ] IMMUNIZATION RECORDS  [x] Release all info      [  ] Check here and initial the line next to each item to release ALL health information INCLUDING  _____ Care and treatment for drug and / or alcohol abuse  _____ HIV testing, infection status, or AIDS  _____ Genetic Testing    DATES OF SERVICE OR TIME PERIOD TO BE DISCLOSED: _____________  I understand and acknowledge that:  * This Authorization may be revoked at any time by you in writing, except if your health information has already been used or disclosed.  * Your health information that will be used or disclosed as a result of you signing this authorization could be re-disclosed by the recipient. If this occurs, your re-disclosed health information may no longer be protected by State or Federal laws.  * You may refuse to sign this Authorization. Your refusal will not affect your ability to obtain treatment.  * This Authorization becomes effective upon signing and will  on (date) __________.      If no date is indicated, this Authorization will  one (1) year from the signature date.    Name: Eric Santana  Signature: Date:   10/6/2023      PLEASE FAX REQUESTED RECORDS BACK TO: [phone]

## 2023-10-11 ENCOUNTER — NON-PROVIDER VISIT (OUTPATIENT)
Dept: CARDIOLOGY | Facility: MEDICAL CENTER | Age: 73
End: 2023-10-11
Attending: STUDENT IN AN ORGANIZED HEALTH CARE EDUCATION/TRAINING PROGRAM
Payer: MEDICARE

## 2023-10-11 DIAGNOSIS — I47.10 SVT (SUPRAVENTRICULAR TACHYCARDIA) (HCC): ICD-10-CM

## 2023-10-11 DIAGNOSIS — I49.3 PVCS (PREMATURE VENTRICULAR CONTRACTIONS): ICD-10-CM

## 2023-10-11 DIAGNOSIS — R55 SYNCOPE, UNSPECIFIED SYNCOPE TYPE: ICD-10-CM

## 2023-10-11 DIAGNOSIS — I49.1 APC (ATRIAL PREMATURE CONTRACTIONS): ICD-10-CM

## 2023-10-11 DIAGNOSIS — R00.2 PALPITATIONS: ICD-10-CM

## 2023-10-11 PROCEDURE — 93246 EXT ECG>7D<15D RECORDING: CPT

## 2023-10-11 NOTE — PROGRESS NOTES
Patient enrolled in the [address] day [facility] Holter monitoring program per Sera Burton MD..   >In-Clinic hook-up, serial # [id number] .   >Currently pending EOS.

## 2023-11-02 ENCOUNTER — TELEPHONE (OUTPATIENT)
Dept: CARDIOLOGY | Facility: MEDICAL CENTER | Age: 73
End: 2023-11-02
Payer: MEDICARE

## 2023-11-02 PROCEDURE — 93248 EXT ECG>7D<15D REV&INTERPJ: CPT | Performed by: STUDENT IN AN ORGANIZED HEALTH CARE EDUCATION/TRAINING PROGRAM

## 2023-11-07 ENCOUNTER — OFFICE VISIT (OUTPATIENT)
Dept: MEDICAL GROUP | Facility: PHYSICIAN GROUP | Age: 73
End: 2023-11-07
Payer: MEDICARE

## 2023-11-07 VITALS
TEMPERATURE: 97.4 F | DIASTOLIC BLOOD PRESSURE: 78 MMHG | RESPIRATION RATE: 19 BRPM | WEIGHT: 245 LBS | OXYGEN SATURATION: 97 % | SYSTOLIC BLOOD PRESSURE: 122 MMHG | HEART RATE: 74 BPM | BODY MASS INDEX: 31.44 KG/M2 | HEIGHT: 74 IN

## 2023-11-07 DIAGNOSIS — R55 SYNCOPE, UNSPECIFIED SYNCOPE TYPE: ICD-10-CM

## 2023-11-07 DIAGNOSIS — I47.10 PAROXYSMAL SVT (SUPRAVENTRICULAR TACHYCARDIA) (HCC): ICD-10-CM

## 2023-11-07 DIAGNOSIS — Z12.11 COLON CANCER SCREENING: ICD-10-CM

## 2023-11-07 DIAGNOSIS — Z11.59 NEED FOR HEPATITIS C SCREENING TEST: ICD-10-CM

## 2023-11-07 DIAGNOSIS — R00.2 PALPITATIONS: ICD-10-CM

## 2023-11-07 DIAGNOSIS — E66.9 OBESITY (BMI 30-39.9): ICD-10-CM

## 2023-11-07 PROBLEM — M22.40 CHONDROMALACIA OF PATELLA: Status: ACTIVE | Noted: 2023-11-07

## 2023-11-07 PROBLEM — C44.310 BASAL CELL CARCINOMA (BCC) OF FACE: Status: ACTIVE | Noted: 2023-11-07

## 2023-11-07 PROCEDURE — 3074F SYST BP LT 130 MM HG: CPT | Performed by: STUDENT IN AN ORGANIZED HEALTH CARE EDUCATION/TRAINING PROGRAM

## 2023-11-07 PROCEDURE — 3078F DIAST BP <80 MM HG: CPT | Performed by: STUDENT IN AN ORGANIZED HEALTH CARE EDUCATION/TRAINING PROGRAM

## 2023-11-07 PROCEDURE — G0439 PPPS, SUBSEQ VISIT: HCPCS | Performed by: STUDENT IN AN ORGANIZED HEALTH CARE EDUCATION/TRAINING PROGRAM

## 2023-11-07 ASSESSMENT — PATIENT HEALTH QUESTIONNAIRE - PHQ9: CLINICAL INTERPRETATION OF PHQ2 SCORE: 0

## 2023-11-07 ASSESSMENT — ENCOUNTER SYMPTOMS: GENERAL WELL-BEING: GOOD

## 2023-11-07 ASSESSMENT — ACTIVITIES OF DAILY LIVING (ADL): BATHING_REQUIRES_ASSISTANCE: 0

## 2023-11-07 NOTE — PROGRESS NOTES
Chief Complaint   Patient presents with    Annual Exam       HPI:  Eric Santana is a 73 y.o. here for Medicare Annual Wellness Visit     Patient Active Problem List    Diagnosis Date Noted    Basal cell carcinoma (BCC) of face 11/07/2023    Chondromalacia of patella 11/07/2023    Paroxysmal SVT (supraventricular tachycardia) 11/07/2023    Syncope 10/03/2023    Other affections of shoulder region, not elsewhere classified 06/30/2015       Current Outpatient Medications   Medication Sig Dispense Refill    MegaRed Omega-3 Krill Oil 500 MG Cap Take 2 Capsules by mouth at bedtime.       No current facility-administered medications for this visit.          Current supplements as per medication list.     Allergies: Patient has no known allergies.    Current social contact/activities: TV, swim, clubs, spend time with family/friends     He  reports that he has never smoked. He has never used smokeless tobacco. He reports current alcohol use of about 2.4 oz of alcohol per week. He reports that he does not use drugs.  Counseling given: Not Answered      ROS:    Gait: Uses no assistive device  Ostomy: No  Other tubes: No  Amputations: No  Chronic oxygen use: No  Last eye exam: 2 months ago  Wears hearing aids: No   : Denies any urinary leakage during the last 6 months    Screening:  Annual  Depression Screening  Little interest or pleasure in doing things?  0 - not at all  Feeling down, depressed , or hopeless? 0 - not at all  Patient Health Questionnaire Score: 0     If depressive symptoms identified deferred to follow up visit unless specifically addressed in assessment and plan.    Interpretation of PHQ-9 Total Score   Score Severity   1-4 No Depression   5-9 Mild Depression   10-14 Moderate Depression   15-19 Moderately Severe Depression   20-27 Severe Depression    Screening for Cognitive Impairment  Do you or any of your friends or family members have any concern about your memory? No  Three Minute Recall (Banana,  Chair Teodora) 2/3  Huan clock face with all 12 numbers and set the hands to show 20 past 8.  Yes  Cognitive concerns identified deferred for follow up unless specifically addressed in assessment and plan.    Fall Risk Assessment  Has the patient had two or more falls in the last year or any fall with injury in the last year?  No    Safety Assessment  Do you always wear your seatbelt?  Yes  Any changes to home needed to function safely? No  Difficulty hearing.  No  Patient counseled about all safety risks that were identified.    Functional Assessment ADLs  Are there any barriers preventing you from cooking for yourself or meeting nutritional needs?  No.   Are there any barriers preventing you from driving safely or obtaining transportation?  No.   Are there any barriers preventing you from using a telephone or calling for help?  No   Are there any barriers preventing you from shopping?  No.   Are there any barriers preventing you from taking care of your own finances?  No   Are there any barriers preventing you from managing your medications?  No   Are there any barriers preventing you from showering, bathing or dressing yourself? No   Are there any barriers preventing you from doing housework or laundry? No  Are there any barriers preventing you from using the toilet?No  Are you currently engaging in any exercise or physical activity?  Yes.     Self-Assessment of Health  What is your perception of your health? Good  Do you sleep more than six hours a night? Yes  In the past 7 days, how much did pain keep you from doing your normal work? None  Do you spend quality time with family or friends (virtually or in person)? Yes  Do you usually eat a heart healthy diet that constists of a variety of fruits, vegetables, whole grains and fiber? Yes  Do you eat foods high in fat and/or Fast Food more than three times per week? No    Advance Care Planning  Do you have an Advance Directive, Living Will, Durable Power of  , or POLST? Yes  Advance Directive       is not on file - instructed patient to bring in a copy to scan into their chart      Health Maintenance Summary            Overdue - Annual Wellness Visit (Every 366 Days) Overdue - never done      No completion history exists for this topic.              Ordered - Hepatitis C Screening (Once) Ordered on 11/7/2023      No completion history exists for this topic.              Ordered - Colorectal Cancer Screening (Colonoscopy - Every 10 Years) Ordered on 11/7/2023      No completion history exists for this topic.              COVID-19 Vaccine (2 - Pfizer series) Next due on 12/17/2023 08/17/2023  Imm Admin: MODERNA BIVALENT BOOSTER SARS-COV-2 VACCINE (6+)    10/26/2021  Imm Admin: MODERNA SARS-COV-2 VACCINE (12+)    03/18/2021  Imm Admin: MODERNA SARS-COV-2 VACCINE (12+)    02/18/2021  Imm Admin: MODERNA SARS-COV-2 VACCINE (12+)              IMM DTaP/Tdap/Td Vaccine (3 - Td or Tdap) Next due on 11/2/2029 11/02/2019  Imm Admin: Tdap Vaccine    11/10/2009  Imm Admin: Tdap Vaccine              Pneumococcal Vaccine: 65+ Years (Series Information) Completed      01/07/2019  Imm Admin: Pneumococcal polysaccharide vaccine (PPSV-23)    10/02/2017  Imm Admin: Pneumococcal Conjugate Vaccine (Prevnar/PCV-13)              Zoster (Shingles) Vaccines (Series Information) Completed      03/23/2023  Imm Admin: Zoster Vaccine Recombinant (RZV) (SHINGRIX)    01/20/2023  Imm Admin: Zoster Vaccine Recombinant (RZV) (SHINGRIX)    11/16/2015  Imm Admin: Zoster Vaccine Live (ZVL) (Zostavax) - HISTORICAL DATA              Influenza Vaccine (Series Information) Completed      10/06/2023  Imm Admin: Influenza Vaccine Adult HD    10/13/2022  Imm Admin: Influenza Vaccine Adult HD    10/20/2021  Imm Admin: Influenza, Unspecified - HISTORICAL DATA    10/19/2021  Imm Admin: Influenza Vaccine Adult HD    10/09/2020  Imm Admin: Influenza Vaccine, Quadrivalent, Adjuvanted (Pf)    Only the  first 5 history entries have been loaded, but more history exists.              Hepatitis A Vaccine (Hep A) (Series Information) Aged Out      No completion history exists for this topic.              Hepatitis B Vaccine (Hep B) (Series Information) Aged Out      No completion history exists for this topic.              HPV Vaccines (Series Information) Aged Out      No completion history exists for this topic.              Polio Vaccine (Inactivated Polio) (Series Information) Aged Out      No completion history exists for this topic.              Meningococcal Immunization (Series Information) Aged Out      No completion history exists for this topic.                    Patient Care Team:  Sera Burton M.D. as PCP - General (Family Medicine)        Social History     Tobacco Use    Smoking status: Never    Smokeless tobacco: Never   Vaping Use    Vaping Use: Never used   Substance Use Topics    Alcohol use: Yes     Alcohol/week: 2.4 oz     Types: 2 Cans of beer, 2 Standard drinks or equivalent per week     Comment: socially    Drug use: No     Family History   Problem Relation Age of Onset    Cancer Mother     Other Father         Cirrohis    Diabetes Paternal Grandfather     No Known Problems Daughter     No Known Problems Son      He  has a past medical history of Arthritis, Dental disorder, Pain (11/2019), and Snoring.   Past Surgical History:   Procedure Laterality Date    TENDON REPAIR Right 11/15/2019    Procedure: REPAIR, TENDON - FOR KNEE OPEN QUADRICEPS TENDON REPAIR AND FERNANDEZ;  Surgeon: Beka Marr M.D.;  Location: [facility];  Service: Orthopedics    SHOULDER DECOMPRESSION ARTHROSCOPIC Left 6/30/2015    Procedure: SHOULDER DECOMPRESSION ARTHROSCOPIC;  Surgeon: Christa Dela Cruz M.D.;  Location: [facility];  Service:     CLAVICLE DISTAL EXCISION Left 6/30/2015    Procedure: CLAVICLE DISTAL EXCISION;  Surgeon: Christa Dela Cruz M.D.;  Location: [facility]  "ORS;  Service:     SHOULDER ARTHROSCOPY W/ ROTATOR CUFF REPAIR Left 2015    Procedure: SHOULDER ARTHROSCOPY W/ ROTATOR CUFF REPAIR /POSSIBLE;  Surgeon: Christa Dela Cruz M.D.;  Location: SURGERY [facility] ORS;  Service:     BLOCK EPIDURAL STEROID INJECTION      UMBILICAL HERNIA REPAIR      ORIF, FRACTURE, ULNA Right     and radius       Exam:   /78   Pulse 74   Temp 36.3 °C (97.4 °F) (Temporal)   Resp 19   Ht 1.88 m (6' 2\")   Wt 111 kg (245 lb)   SpO2 97%  Body mass index is 31.46 kg/m².    Hearing good.    Dentition good  Alert, oriented in no acute distress.  Eye contact is good, speech goal directed, affect calm    Assessment and Plan. The following treatment and monitoring plan is recommended:      1. Syncope, unspecified syncope type  Patient reports no further syncopal episodes since 2023.  Syncopal episode was likely secondary to paroxysmal SVT.  Patient is scheduled to see cardiology on .    2. Paroxysmal SVT (supraventricular tachycardia)  10 episodes of SVT noted on event monitor.  Patient currently denies chest pain and shortness of breath.  Patient is scheduled with cardiology for .  Advised patient to go to the emergency room if he begins having chest pain, shortness of breath, and racing heart rate.  Patient and wife verbalized understanding.    3. Obesity (BMI 30-39.9)  BMI of 31.46.  Healthy diet and exercise encouraged.  - Lipid Profile; Future    4. Colon cancer screening  - COLOGUARD (FIT DNA)    5. Need for hepatitis C screening test  - HCV Scrn ( [id number] 1xLife); Future            Services suggested: No services needed at this time  Health Care Screening: Age-appropriate preventive services recommended by USPTF and ACIP covered by Medicare were discussed today. Services ordered if indicated and agreed upon by the patient.  Referrals offered: Community-based lifestyle interventions to reduce health risks and promote self-management " and wellness, fall prevention, nutrition, physical activity, tobacco-use cessation, weight loss, and mental health services as per orders if indicated.    Discussion today about general wellness and lifestyle habits:    Prevent falls and reduce trip hazards; Cautioned about securing or removing rugs.  Have a working fire alarm and carbon monoxide detector;   Engage in regular physical activity and social activities     Follow-up: Return in about 1 year (around 11/7/2024) for Annual.

## 2023-11-10 ENCOUNTER — HOSPITAL ENCOUNTER (OUTPATIENT)
Dept: LAB | Facility: MEDICAL CENTER | Age: 73
End: 2023-11-10
Attending: STUDENT IN AN ORGANIZED HEALTH CARE EDUCATION/TRAINING PROGRAM
Payer: MEDICARE

## 2023-11-10 DIAGNOSIS — Z11.59 NEED FOR HEPATITIS C SCREENING TEST: ICD-10-CM

## 2023-11-10 DIAGNOSIS — E66.9 OBESITY (BMI 30-39.9): ICD-10-CM

## 2023-11-10 LAB
CHOLEST SERPL-MCNC: 169 MG/DL (ref 100–199)
FASTING STATUS PATIENT QL REPORTED: NORMAL
HCV AB SER QL: NORMAL
HDLC SERPL-MCNC: 50 MG/DL
LDLC SERPL CALC-MCNC: 106 MG/DL
TRIGL SERPL-MCNC: 67 MG/DL (ref 0–149)

## 2023-11-10 PROCEDURE — G0472 HEP C SCREEN HIGH RISK/OTHER: HCPCS

## 2023-11-10 PROCEDURE — 80061 LIPID PANEL: CPT

## 2023-11-10 PROCEDURE — 36415 COLL VENOUS BLD VENIPUNCTURE: CPT

## 2023-11-16 ENCOUNTER — TELEPHONE (OUTPATIENT)
Dept: CARDIOLOGY | Facility: MEDICAL CENTER | Age: 73
End: 2023-11-16
Payer: MEDICARE

## 2023-11-16 DIAGNOSIS — R00.2 PALPITATIONS: ICD-10-CM

## 2023-11-20 ENCOUNTER — TELEMEDICINE2 (OUTPATIENT)
Dept: CARDIOLOGY | Facility: MEDICAL CENTER | Age: 73
End: 2023-11-20
Attending: INTERNAL MEDICINE
Payer: MEDICARE

## 2023-11-20 VITALS
OXYGEN SATURATION: 96 % | HEIGHT: 74 IN | WEIGHT: 245.6 LBS | TEMPERATURE: 97.7 F | SYSTOLIC BLOOD PRESSURE: 104 MMHG | DIASTOLIC BLOOD PRESSURE: 72 MMHG | HEART RATE: 87 BPM | RESPIRATION RATE: 14 BRPM | BODY MASS INDEX: 31.52 KG/M2

## 2023-11-20 DIAGNOSIS — R55 SYNCOPE, UNSPECIFIED SYNCOPE TYPE: ICD-10-CM

## 2023-11-20 PROCEDURE — 99204 OFFICE O/P NEW MOD 45 MIN: CPT | Mod: 95 | Performed by: INTERNAL MEDICINE

## 2023-11-20 PROCEDURE — 3078F DIAST BP <80 MM HG: CPT | Performed by: INTERNAL MEDICINE

## 2023-11-20 PROCEDURE — 3074F SYST BP LT 130 MM HG: CPT | Performed by: INTERNAL MEDICINE

## 2023-11-20 ASSESSMENT — ENCOUNTER SYMPTOMS
SYNCOPE: 0
WEIGHT LOSS: 0
DYSPNEA ON EXERTION: 0
NEAR-SYNCOPE: 0
COUGH: 0
NAUSEA: 0
PALPITATIONS: 0
SHORTNESS OF BREATH: 0
CONSTIPATION: 0
BACK PAIN: 0
ABDOMINAL PAIN: 0
FLANK PAIN: 0
DEPRESSION: 0
HEARTBURN: 0
ORTHOPNEA: 0
CLAUDICATION: 0
BLURRED VISION: 0
DIZZINESS: 0
FEVER: 0
VOMITING: 0
PND: 0
ALTERED MENTAL STATUS: 0
IRREGULAR HEARTBEAT: 0
DECREASED APPETITE: 0
WEIGHT GAIN: 0
DIARRHEA: 0
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
## 2023-11-27 ENCOUNTER — HOSPITAL ENCOUNTER (OUTPATIENT)
Dept: CARDIOLOGY | Facility: MEDICAL CENTER | Age: 73
End: 2023-11-27
Attending: INTERNAL MEDICINE
Payer: MEDICARE

## 2023-11-27 DIAGNOSIS — R55 SYNCOPE, UNSPECIFIED SYNCOPE TYPE: ICD-10-CM

## 2023-11-27 PROCEDURE — 93306 TTE W/DOPPLER COMPLETE: CPT

## 2023-11-28 LAB
LV EJECT FRACT  99904: 67
LV EJECT FRACT MOD 2C 99903: 72.5
LV EJECT FRACT MOD 4C 99902: 62.01
LV EJECT FRACT MOD BP 99901: 66.53

## 2023-11-28 PROCEDURE — 93306 TTE W/DOPPLER COMPLETE: CPT | Mod: 26 | Performed by: INTERNAL MEDICINE

## 2023-12-04 ENCOUNTER — HOSPITAL ENCOUNTER (OUTPATIENT)
Dept: RADIOLOGY | Facility: MEDICAL CENTER | Age: 73
End: 2023-12-04
Attending: INTERNAL MEDICINE
Payer: MEDICARE

## 2023-12-04 DIAGNOSIS — R55 SYNCOPE, UNSPECIFIED SYNCOPE TYPE: ICD-10-CM

## 2023-12-04 PROCEDURE — 93017 CV STRESS TEST TRACING ONLY: CPT

## 2023-12-06 PROCEDURE — 93018 CV STRESS TEST I&R ONLY: CPT | Performed by: INTERNAL MEDICINE

## 2023-12-13 DIAGNOSIS — I47.10 PAROXYSMAL SVT (SUPRAVENTRICULAR TACHYCARDIA) (HCC): ICD-10-CM

## 2023-12-13 RX ORDER — METOPROLOL SUCCINATE 25 MG/1
12.5 TABLET, EXTENDED RELEASE ORAL DAILY
Qty: 30 TABLET | Refills: 3 | Status: SHIPPED | OUTPATIENT
Start: 2023-12-13 | End: 2024-02-01 | Stop reason: SDUPTHER

## 2023-12-20 DIAGNOSIS — R19.5 POSITIVE COLORECTAL CANCER SCREENING USING COLOGUARD TEST: ICD-10-CM

## 2024-01-31 ENCOUNTER — TELEPHONE (OUTPATIENT)
Dept: CARDIOLOGY | Facility: MEDICAL CENTER | Age: 74
End: 2024-01-31
Payer: MEDICARE

## 2024-01-31 NOTE — TELEPHONE ENCOUNTER
FANG      Caller: Devorah @ [facility] [facility]     Fax Number:  [phone]    Procedure Name: Colonoscopy     Procedure Scheduled Date: 03/15/24  Clearance was faxed 01/30/24 to [phone]    Callback Number: [phone]. [phone]    Thank You   Lisa ZHANG

## 2024-01-31 NOTE — LETTER
PROCEDURE/SURGERY CLEARANCE FORM      Encounter Date: 1/31/2024    Patient: Eric Santana  YOB: 1950    CARDIOLOGIST:  Deyvi Dodge M.D.    REFERRING DOCTOR:  No ref. provider found    The above patient is cleared to have the following procedure/surgery: Colonoscopy                                              PROCEDURE/SURGERY CLEARANCE FORM    Date: 2/1/2024   Patient Name: Eric Santana    Dear Surgeon or Proceduralist,      Thank you for your request for cardiac stratification of our mutual patient Eric Santana 1950. We have reviewed their [facility] records; and to the best of our understanding this patient has not had stenting, ablation, cardiothoracic surgery or hospitalization for cardiovascular reasons in the past 6 months.  Eric Santana has been seen within the past 18 months and is considered to have non-modifiable cardiac risk for this low-risk procedure/surgery. They may proceed from a cardiovascular standpoint and may hold their antiplatelet/anticoagulation as briefly as possible. Please have patient resume this medication when hemodynamically stable to do so.     Aspirin or Prasugrel   - hold 7 days prior to procedure/surgery, resume when hemodynamically stable      Clopidrogrel or Ticagrelor  - hold 7 days for all neurological procedures, hold 5 days prior to all other procedure/surgery,  resume when hemodynamically stable     Warfarin - hold 7 days for all neurological procedures, hold 5 days prior to all other procedure/surgery and coordinate with [facility] ([phone]) INR testing and dose management.      Pradaxa/Xarelto/Eliquis/Savesya - hold 1 day prior to procedure for low bleeding risk procedure, 2 days for high bleeding risk procedure, or consider holding 3 days or longer for patients with reduced kidney function (CrCl <30mL/min) or spinal/cranial surgeries/procedures.      If they have a mechanical heart valve, please coordinate  with [facility] ([phone]) the proper management of their anticoagulant in the periprocedural or perioperative period.      Some patients have higher risk for cardiovascular complications or holding medication. If our patient has had prior complications of holding antiplatelet or anticoagulants in the past and we have seen them after these events, we have addressed these concerns with the patient. They are at an unknown degree of increased risk for recurrent complication.  You may hold anticoagulation/antiplatelets for the procedure or surgery if the benefits of the procedure or surgery outweigh this nonmodifiable risk.      If Eric Santana 1950 has new symptoms of heart failure decompensation, unstable arrythmia, or angina please reach out and we will assess the patient.      If you have other patient-specific concerns, please feel free to reach out to the patient's cardiologist directly at [phone].     Thank you,       [facility]

## 2024-02-01 ENCOUNTER — OFFICE VISIT (OUTPATIENT)
Dept: CARDIOLOGY | Facility: PHYSICIAN GROUP | Age: 74
End: 2024-02-01
Payer: MEDICARE

## 2024-02-01 VITALS
WEIGHT: 245 LBS | OXYGEN SATURATION: 96 % | HEIGHT: 74 IN | DIASTOLIC BLOOD PRESSURE: 66 MMHG | SYSTOLIC BLOOD PRESSURE: 118 MMHG | RESPIRATION RATE: 12 BRPM | BODY MASS INDEX: 31.44 KG/M2 | HEART RATE: 63 BPM

## 2024-02-01 DIAGNOSIS — I49.3 PVC (PREMATURE VENTRICULAR CONTRACTION): ICD-10-CM

## 2024-02-01 DIAGNOSIS — I47.10 PAROXYSMAL SVT (SUPRAVENTRICULAR TACHYCARDIA) (HCC): ICD-10-CM

## 2024-02-01 DIAGNOSIS — R55 SYNCOPE, UNSPECIFIED SYNCOPE TYPE: ICD-10-CM

## 2024-02-01 PROCEDURE — 99214 OFFICE O/P EST MOD 30 MIN: CPT | Performed by: INTERNAL MEDICINE

## 2024-02-01 PROCEDURE — 3074F SYST BP LT 130 MM HG: CPT | Performed by: INTERNAL MEDICINE

## 2024-02-01 PROCEDURE — 3078F DIAST BP <80 MM HG: CPT | Performed by: INTERNAL MEDICINE

## 2024-02-01 RX ORDER — METOPROLOL SUCCINATE 25 MG/1
25 TABLET, EXTENDED RELEASE ORAL DAILY
Qty: 30 TABLET | Refills: 3 | Status: SHIPPED | OUTPATIENT
Start: 2024-02-01

## 2024-02-01 ASSESSMENT — ENCOUNTER SYMPTOMS
FLANK PAIN: 0
HEARTBURN: 0
VOMITING: 0
CONSTIPATION: 0
ALTERED MENTAL STATUS: 0
SHORTNESS OF BREATH: 0
PALPITATIONS: 0
DECREASED APPETITE: 0
CLAUDICATION: 0
NEAR-SYNCOPE: 0
IRREGULAR HEARTBEAT: 0
DIARRHEA: 0
FEVER: 0
DIZZINESS: 0
ABDOMINAL PAIN: 0
NAUSEA: 0
COUGH: 0
BACK PAIN: 0
DEPRESSION: 0
WEIGHT LOSS: 0
PND: 0
SYNCOPE: 0
WEIGHT GAIN: 0
ORTHOPNEA: 0
BLURRED VISION: 0
DYSPNEA ON EXERTION: 0

## 2024-02-01 NOTE — TELEPHONE ENCOUNTER
"Last OV: 11/20/2023  Proposed Surgery: Colonoscopy    Surgery Date: 03/15/24  Requesting Office Name: [email or web address]   Fax Number: [phone]   Preference of Location (default is surgery center unless specified by Cardiologist or [facility])  Prior Clearance Addressed: No      Anticoags/Antiplatelets: Other none   Outstanding Cardiac Imaging : No  Stent, Cardiac Devices, or Catheterization: No  Ablation, TAVR/Valve (including open heart), Cardioversion: No  Recent Cardiac Hospitalization: No            When: N/A  History (cardiac history):   Past Medical History:   Diagnosis Date    Arthritis     joints and L shoulder    Dental disorder     lower denture    Pain 11/2019    right knee    Snoring     states told he \"catches his breath\"occ             Surgical Clearance Letter Sent: YES   **Scan clearance request letter into [facility].**   "

## 2024-02-01 NOTE — PATIENT INSTRUCTIONS
Increase from 1/2 tab to 1 full tab. Track vitals at home. In two weeks send results consider further titrating.

## 2024-02-01 NOTE — PROGRESS NOTES
"  Cardiology Note    syncope    History of Present Illness: Eric Santana is a 73 y.o. male who presents for follow up visit.    Doing well. No recurrent symptoms. Compliant with medications and denies adverse effects. Active almost every day. No cardiovascular limitations while doing so.     Review of Systems   Constitutional: Negative for decreased appetite, fever, malaise/fatigue, weight gain and weight loss.   HENT:  Negative for congestion and nosebleeds.    Eyes:  Negative for blurred vision.   Cardiovascular:  Negative for chest pain, claudication, dyspnea on exertion, irregular heartbeat, leg swelling, near-syncope, orthopnea, palpitations, paroxysmal nocturnal dyspnea and syncope.   Respiratory:  Negative for cough and shortness of breath.    Endocrine: Negative for cold intolerance and heat intolerance.   Skin:  Negative for rash.   Musculoskeletal:  Negative for back pain.   Gastrointestinal:  Negative for abdominal pain, constipation, diarrhea, heartburn, melena, nausea and vomiting.   Genitourinary:  Negative for dysuria, flank pain and hematuria.   Neurological:  Negative for dizziness.   Psychiatric/Behavioral:  Negative for altered mental status and depression.          Past Medical History:   Diagnosis Date    Arthritis     joints and L shoulder    Dental disorder     lower denture    Pain 11/2019    right knee    Snoring     states told he \"catches his breath\"occ         Past Surgical History:   Procedure Laterality Date    TENDON REPAIR Right 11/15/2019    Procedure: REPAIR, TENDON - FOR KNEE OPEN QUADRICEPS TENDON REPAIR AND FERNANDEZ;  Surgeon: Beka Marr M.D.;  Location: [facility];  Service: Orthopedics    SHOULDER DECOMPRESSION ARTHROSCOPIC Left 6/30/2015    Procedure: SHOULDER DECOMPRESSION ARTHROSCOPIC;  Surgeon: Christa Dela Cruz M.D.;  Location: [facility];  Service:     CLAVICLE DISTAL EXCISION Left 6/30/2015    Procedure: CLAVICLE DISTAL EXCISION;  " Surgeon: Christa Dela Cruz M.D.;  Location: SURGERY [facility];  Service:     SHOULDER ARTHROSCOPY W/ ROTATOR CUFF REPAIR Left 6/30/2015    Procedure: SHOULDER ARTHROSCOPY W/ ROTATOR CUFF REPAIR /POSSIBLE;  Surgeon: Christa Dela Cruz M.D.;  Location: SURGERY [facility];  Service:     BLOCK EPIDURAL STEROID INJECTION  2014    UMBILICAL HERNIA REPAIR  2007    ORIF, FRACTURE, ULNA Right     and radius         Current Outpatient Medications   Medication Sig Dispense Refill    metoprolol SR (TOPROL XL) 25 MG TABLET SR 24 HR Take 1 Tablet by mouth every day. 30 Tablet 3    MegaRed Omega-3 Krill Oil 500 MG Cap Take 2 Capsules by mouth at bedtime.       No current facility-administered medications for this visit.         No Known Allergies      Family History   Problem Relation Age of Onset    Cancer Mother     Other Father         Cirrohis    Diabetes Paternal Grandfather     No Known Problems Daughter     No Known Problems Son          Social History     Socioeconomic History    Marital status:      Spouse name: Not on file    Number of children: Not on file    Years of education: Not on file    Highest education level: Bachelor's degree (e.g., BA, AB, BS)   Occupational History    Not on file   Tobacco Use    Smoking status: Never    Smokeless tobacco: Never   Vaping Use    Vaping Use: Never used   Substance and Sexual Activity    Alcohol use: Yes     Alcohol/week: 2.4 oz     Types: 2 Cans of beer, 2 Standard drinks or equivalent per week     Comment: socially    Drug use: No    Sexual activity: Not on file   Other Topics Concern    Not on file   Social History Narrative    Not on file     Social Determinants of Health     Financial Resource Strain: Low Risk  (10/4/2023)    Overall Financial Resource Strain (CARDIA)     Difficulty of Paying Living Expenses: Not very hard   Food Insecurity: No Food Insecurity (10/4/2023)    Hunger Vital Sign     Worried About Running Out of Food in the Last Year:  "Never true     Ran Out of Food in the Last Year: Never true   Transportation Needs: No Transportation Needs (10/4/2023)    PRAPARE - Transportation     Lack of Transportation (Medical): No     Lack of Transportation (Non-Medical): No   Physical Activity: Sufficiently Active (10/4/2023)    Exercise Vital Sign     Days of Exercise per Week: 3 days     Minutes of Exercise per Session: 50 min   Stress: No Stress Concern Present (10/4/2023)    [employer] - Occupational Stress Questionnaire     Feeling of Stress : Not at all   Social Connections: Moderately Integrated (10/4/2023)    Social Connection and Isolation Panel [NHANES]     Frequency of Communication with Friends and Family: Once a week     Frequency of Social Gatherings with Friends and Family: Once a week     Attends Restorationism Services: More than 4 times per year     Active Member of Clubs or Organizations: Yes     Attends Club or Organization Meetings: More than 4 times per year     Marital Status:    Intimate Partner Violence: Not on file   Housing Stability: Low Risk  (10/4/2023)    Housing Stability Vital Sign     Unable to Pay for Housing in the Last Year: No     Number of Places Lived in the Last Year: 1     Unstable Housing in the Last Year: No         Physical Exam:  Ambulatory Vitals  /66 (BP Location: Left arm, Patient Position: Sitting, BP Cuff Size: Adult)   Pulse 63   Resp 12   Ht 1.88 m (6' 2\")   Wt 111 kg (245 lb)   SpO2 96%    BP Readings from Last 4 Encounters:   02/01/24 118/66   11/20/23 104/72   11/07/23 122/78   10/06/23 118/70     Weight/BMI:   Vitals:    02/01/24 0951   BP: 118/66   Weight: 111 kg (245 lb)   Height: 1.88 m (6' 2\")    Body mass index is 31.46 kg/m².  Wt Readings from Last 4 Encounters:   02/01/24 111 kg (245 lb)   11/20/23 111 kg (245 lb 9.6 oz)   11/07/23 111 kg (245 lb)   10/06/23 112 kg (248 lb)       Physical Exam  Constitutional:       General: He is not in acute " "distress.  HENT:      Head: Normocephalic and atraumatic.   Eyes:      Conjunctiva/sclera: Conjunctivae normal.      Pupils: Pupils are equal, round, and reactive to light.   Neck:      Vascular: No JVD.   Cardiovascular:      Rate and Rhythm: Normal rate and regular rhythm.      Heart sounds: Normal heart sounds. No murmur heard.     No friction rub. No gallop.   Pulmonary:      Effort: Pulmonary effort is normal. No respiratory distress.      Breath sounds: Normal breath sounds. No wheezing or rales.   Chest:      Chest wall: No tenderness.   Abdominal:      General: Bowel sounds are normal. There is no distension.      Palpations: Abdomen is soft.   Musculoskeletal:      Cervical back: Normal range of motion and neck supple.   Skin:     General: Skin is warm and dry.   Neurological:      Mental Status: He is alert and oriented to person, place, and time.   Psychiatric:         Mood and Affect: Affect normal.         Judgment: Judgment normal.           Lab Data Review:  Lab Results   Component Value Date/Time    CHOLSTRLTOT 169 11/10/2023 10:34 AM     (H) 11/10/2023 10:34 AM    HDL 50 11/10/2023 10:34 AM    TRIGLYCERIDE 67 11/10/2023 10:34 AM       Lab Results   Component Value Date/Time    SODIUM 139 10/03/2023 03:41 PM    POTASSIUM 4.1 10/03/2023 03:41 PM    CHLORIDE 107 10/03/2023 03:41 PM    CO2 23 10/03/2023 03:41 PM    GLUCOSE 87 10/03/2023 03:41 PM    BUN 21 10/03/2023 03:41 PM    CREATININE 1.06 10/03/2023 03:41 PM     CrCl cannot be calculated (Patient's most recent lab result is older than the maximum 7 days allowed.).  No results found for: \"ALKPHOSPHAT\", \"ASTSGOT\", \"ALTSGPT\", \"TBILIRUBIN\"   Lab Results   Component Value Date/Time    WBC 5.8 10/03/2023 03:41 PM     No results found for: \"HBA1C\"  No components found for: \"TROP\"      Cardiac Imaging and Procedures Review:      EKG 11/20/23 interpreted by me normal sinus rhythm  EKG 10/3/23 sinus nahomy 55 bpm    Cxr 10/3/23  IMPRESSION:  1.  No " acute cardiac or pulmonary abnormalities are identified.    DOS: 10/11/2023-10/25/2023  Summary:  The patient was monitored for 13 days and 22 hours.  Predominant underlying rhythm was sinus rhythm.  10 runs of supraventricular tachycardia occurred, the fastest run lasting 7 beats with a max rate of 197 bpm, and the longest run lasting 1 minute with an average rate of 157 bpm.  Rare PACs less than 1%.  Rare PVCs less than 1%.  No evidence of pauses or bradyarrhythmias.  Symptoms correlated with SVT and sinus rhythm with PACs and PVCs.     TTE 11/27/23  CONCLUSIONS  Normal transthoracic echocardiogram.     Mpi spect 12/4/23  CONCLUSIONS   Negative stress test for ischemia.   Ventricular couplet and PVCs with exercise; frequent.    Medical Decision Making:  Problem List Items Addressed This Visit       Syncope    Paroxysmal SVT (supraventricular tachycardia)    Relevant Medications    metoprolol SR (TOPROL XL) 25 MG TABLET SR 24 HR    PVC (premature ventricular contraction)    Relevant Medications    metoprolol SR (TOPROL XL) 25 MG TABLET SR 24 HR       Stable cardiac testing. Syncope resolved. Likely multifactorial.     PVCs / SVT - titrate metoprolol. Send home vitals to further titrate.     It was my pleasure to meet with Mr. Santana.

## 2024-05-16 ENCOUNTER — OFFICE VISIT (OUTPATIENT)
Dept: MEDICAL GROUP | Facility: PHYSICIAN GROUP | Age: 74
End: 2024-05-16
Payer: MEDICARE

## 2024-05-16 VITALS
OXYGEN SATURATION: 95 % | WEIGHT: 247 LBS | TEMPERATURE: 98.3 F | HEIGHT: 74 IN | DIASTOLIC BLOOD PRESSURE: 66 MMHG | BODY MASS INDEX: 31.7 KG/M2 | SYSTOLIC BLOOD PRESSURE: 118 MMHG | HEART RATE: 85 BPM

## 2024-05-16 DIAGNOSIS — M54.50 ACUTE LEFT-SIDED LOW BACK PAIN WITHOUT SCIATICA: ICD-10-CM

## 2024-05-16 DIAGNOSIS — I47.10 PAROXYSMAL SVT (SUPRAVENTRICULAR TACHYCARDIA) (HCC): ICD-10-CM

## 2024-05-16 PROCEDURE — 3074F SYST BP LT 130 MM HG: CPT | Performed by: STUDENT IN AN ORGANIZED HEALTH CARE EDUCATION/TRAINING PROGRAM

## 2024-05-16 PROCEDURE — 3078F DIAST BP <80 MM HG: CPT | Performed by: STUDENT IN AN ORGANIZED HEALTH CARE EDUCATION/TRAINING PROGRAM

## 2024-05-16 PROCEDURE — 99213 OFFICE O/P EST LOW 20 MIN: CPT | Performed by: STUDENT IN AN ORGANIZED HEALTH CARE EDUCATION/TRAINING PROGRAM

## 2024-05-16 RX ORDER — CYCLOBENZAPRINE HCL 10 MG
10 TABLET ORAL 3 TIMES DAILY PRN
Qty: 30 TABLET | Refills: 0 | Status: SHIPPED | OUTPATIENT
Start: 2024-05-16 | End: 2024-05-26

## 2024-05-16 RX ORDER — METOPROLOL SUCCINATE 25 MG/1
25 TABLET, EXTENDED RELEASE ORAL DAILY
Qty: 30 TABLET | Refills: 3 | Status: SHIPPED | OUTPATIENT
Start: 2024-05-16

## 2024-05-16 RX ORDER — NAPROXEN 500 MG/1
500 TABLET ORAL 2 TIMES DAILY WITH MEALS
Qty: 20 TABLET | Refills: 0 | Status: SHIPPED | OUTPATIENT
Start: 2024-05-16 | End: 2024-05-26

## 2024-05-16 ASSESSMENT — ENCOUNTER SYMPTOMS
CHILLS: 0
PALPITATIONS: 0
FEVER: 0
SHORTNESS OF BREATH: 0

## 2024-05-16 ASSESSMENT — PATIENT HEALTH QUESTIONNAIRE - PHQ9: CLINICAL INTERPRETATION OF PHQ2 SCORE: 0

## 2024-05-16 NOTE — PROGRESS NOTES
"Subjective:   Verbal consent was acquired by the patient to use [employer] ambient listening note generation during this visit Yes     CC: Back Pain    History of Present Illness  Mr. Max stallings 74-year-old who presents today for 2-week history of low back pain.    The patient reports a palpable muscle note in his left lower back, which has since migrated from the spine to the hip region. This pain, which has been present for approximately 2 weeks, is particularly severe when entering and exiting a vehicle, and while lying in bed at night. He denies any recent strenuous activities that could potentially exacerbate the pain, with the exception of yard work. The initial week post-onset, he described the pain as a dull sensation, which escalated to sharp, localized pain upon sitting. He denies any radiating pain. He engages in swimming thrice weekly, but experienced pain during swimming activities, such as lying or floating in the water. Prior to Monday, he found some relief from the use of a heating pad. The patient has been self-medicating with Tylenol, but has not tried ibuprofen.      Patient Active Problem List    Diagnosis Date Noted    PVC (premature ventricular contraction) 02/01/2024    Basal cell carcinoma (BCC) of face 11/07/2023    Chondromalacia of patella 11/07/2023    Paroxysmal SVT (supraventricular tachycardia) (HCC) 11/07/2023    Syncope 10/03/2023    Other affections of shoulder region, not elsewhere classified 06/30/2015       Health Maintenance: Completed    ROS:  Review of Systems   Constitutional:  Negative for chills and fever.   Respiratory:  Negative for shortness of breath.    Cardiovascular:  Negative for chest pain and palpitations.       Objective:     Exam:  /66 (BP Location: Left arm, Patient Position: Sitting, BP Cuff Size: Adult)   Pulse 85   Temp 36.8 °C (98.3 °F) (Temporal)   Ht 1.88 m (6' 2\")   Wt 112 kg (247 lb)   SpO2 95%   BMI 31.71 kg/m²  Body mass index is " 31.71 kg/m².    Physical Exam  Musculoskeletal:      Cervical back: Normal.      Thoracic back: No swelling, spasms or tenderness. Normal range of motion.      Lumbar back: Spasms and tenderness present. Normal range of motion.         Assessment & Plan:     74 y.o. male with the following -     1. Acute left-sided low back pain without sciatica  Acute, ongoing.  Acute left-sided low back pain likely secondary to muscle spasms due to muscle tightness.  Will prescribe patient Flexeril 10 mg to be taken nightly.  Side effects of Flexeril were discussed with the patient.  Patient to also take naproxen 500 mg twice daily for 10 days.  Was advised to take naproxen with food and to stay well-hydrated.  Patient was also provided with stretches for the lower back.  - cyclobenzaprine (FLEXERIL) 10 mg Tab; Take 1 Tablet by mouth 3 times a day as needed for Muscle Spasms for up to 10 days.  Dispense: 30 Tablet; Refill: 0  - naproxen (NAPROSYN) 500 MG Tab; Take 1 Tablet by mouth 2 times a day with meals for 10 days.  Dispense: 20 Tablet; Refill: 0    2. Paroxysmal SVT (supraventricular tachycardia) (HCC)  Chronic, stable.  Patient requesting refill for metoprolol 25 mg daily.  - metoprolol SR (TOPROL XL) 25 MG TABLET SR 24 HR; Take 1 Tablet by mouth every day.  Dispense: 30 Tablet; Refill: 3          Return if symptoms worsen or fail to improve.    Please note that this dictation was created using voice recognition software. I have made every reasonable attempt to correct obvious errors, but I expect that there are errors of grammar and possibly content that I did not discover before finalizing the note.

## 2024-05-29 ENCOUNTER — OFFICE VISIT (OUTPATIENT)
Dept: MEDICAL GROUP | Facility: PHYSICIAN GROUP | Age: 74
End: 2024-05-29
Payer: MEDICARE

## 2024-05-29 VITALS
WEIGHT: 247 LBS | DIASTOLIC BLOOD PRESSURE: 70 MMHG | SYSTOLIC BLOOD PRESSURE: 128 MMHG | OXYGEN SATURATION: 98 % | BODY MASS INDEX: 31.7 KG/M2 | TEMPERATURE: 97 F | HEIGHT: 74 IN | HEART RATE: 66 BPM

## 2024-05-29 DIAGNOSIS — H93.13 BILATERAL TINNITUS: ICD-10-CM

## 2024-05-29 RX ORDER — NAPROXEN 500 MG/1
500 TABLET ORAL 2 TIMES DAILY WITH MEALS
COMMUNITY
Start: 2024-05-17 | End: 2025-05-16

## 2024-05-29 ASSESSMENT — ENCOUNTER SYMPTOMS
PALPITATIONS: 0
CHILLS: 0
SHORTNESS OF BREATH: 0
FEVER: 0

## 2024-05-29 NOTE — PROGRESS NOTES
Subjective:   Verbal consent was acquired by the patient to use [email or web address] ambient listening note generation during this visit Yes     CC: Bilateral tinnitus    History of Present Illness  Max is a pleasant 74-year-old who presents today for bilateral tinnitus    The patient reports a persistent, low-level bilateral tinnitus that has been persisting for at least one week. This symptom, which he describes as a constant buzzing, is consistent throughout the day. He has a history of similar episodes, each lasting approximately a minute before subsiding. Despite the absence of hearing loss or dizziness, he acknowledges a history of hearing loss, although no recent testing has been conducted. [facility]. He and his wife have difficulty listening to the television at a high level, requiring him to increase the volume. He denies any recent use of aspirin. He took naproxen this morning, but the buzzing predates the initiation of naproxen. He has abstained from Flexeril for a few days. He denies any otalgia or otorrhea. He has not swam since last Friday and denies any alterations in his respiratory sounds. He denies any other symptoms. He has a history of seasonal allergies, which have not worsened in the past few weeks. He owns a nighttimeir filter fan.    Reports that his back pain has significantly improved.      Patient Active Problem List    Diagnosis Date Noted    PVC (premature ventricular contraction) 02/01/2024    Basal cell carcinoma (BCC) of face 11/07/2023    Chondromalacia of patella 11/07/2023    Paroxysmal SVT (supraventricular tachycardia) (HCC) 11/07/2023    Syncope 10/03/2023    Other affections of shoulder region, not elsewhere classified 06/30/2015     Health Maintenance: Completed    ROS:  Review of Systems   Constitutional:  Negative for chills and fever.   Respiratory:  Negative for shortness of breath.    Cardiovascular:  Negative for chest pain and palpitations.  "      Objective:     Exam:  /70 (BP Location: Left arm, Patient Position: Sitting, BP Cuff Size: Adult)   Pulse 66   Temp 36.1 °C (97 °F) (Temporal)   Ht 1.88 m (6' 2\")   Wt 112 kg (247 lb)   SpO2 98%   BMI 31.71 kg/m²  Body mass index is 31.71 kg/m².    Physical Exam  HENT:      Right Ear: Hearing, tympanic membrane, ear canal and external ear normal.      Left Ear: Hearing, tympanic membrane, ear canal and external ear normal.   Eyes:      Extraocular Movements: Extraocular movements intact.   Neurological:      Mental Status: He is alert.   Psychiatric:         Mood and Affect: Mood normal.           Assessment & Plan:     74 y.o. male with the following -     1. Bilateral tinnitus  Chronic, worsening.  Presents with continuous bilateral tinnitus that has been ongoing for the past week.  Patient has history of hearing loss but denies sudden hearing loss.  He denies having dizziness.  Will refer patient to audiology.  Patient was advised to use a white noise machine.  Patient was also advised to discontinue naproxen due to NSAIDs contributing to tinnitus.   - Referral to Audiology        Return if symptoms worsen or fail to improve.    Please note that this dictation was created using voice recognition software. I have made every reasonable attempt to correct obvious errors, but I expect that there are errors of grammar and possibly content that I did not discover before finalizing the note.  "

## 2024-10-10 ENCOUNTER — OFFICE VISIT (OUTPATIENT)
Dept: CARDIOLOGY | Facility: PHYSICIAN GROUP | Age: 74
End: 2024-10-10
Payer: MEDICARE

## 2024-10-10 VITALS
RESPIRATION RATE: 12 BRPM | HEART RATE: 56 BPM | OXYGEN SATURATION: 96 % | BODY MASS INDEX: 31.06 KG/M2 | SYSTOLIC BLOOD PRESSURE: 112 MMHG | HEIGHT: 74 IN | WEIGHT: 242 LBS | DIASTOLIC BLOOD PRESSURE: 60 MMHG

## 2024-10-10 DIAGNOSIS — I49.3 PVC (PREMATURE VENTRICULAR CONTRACTION): ICD-10-CM

## 2024-10-10 DIAGNOSIS — I47.10 PAROXYSMAL SVT (SUPRAVENTRICULAR TACHYCARDIA) (HCC): ICD-10-CM

## 2024-10-10 PROCEDURE — 3078F DIAST BP <80 MM HG: CPT | Performed by: INTERNAL MEDICINE

## 2024-10-10 PROCEDURE — 3074F SYST BP LT 130 MM HG: CPT | Performed by: INTERNAL MEDICINE

## 2024-10-10 PROCEDURE — 99214 OFFICE O/P EST MOD 30 MIN: CPT | Performed by: INTERNAL MEDICINE

## 2024-10-10 RX ORDER — METOPROLOL SUCCINATE 25 MG/1
25 TABLET, EXTENDED RELEASE ORAL DAILY
Qty: 100 TABLET | Refills: 3 | Status: SHIPPED | OUTPATIENT
Start: 2024-10-10

## 2024-10-10 ASSESSMENT — ENCOUNTER SYMPTOMS
DECREASED APPETITE: 0
WEIGHT GAIN: 0
DIZZINESS: 0
ALTERED MENTAL STATUS: 0
VOMITING: 0
NAUSEA: 0
NEAR-SYNCOPE: 0
COUGH: 0
ORTHOPNEA: 0
IRREGULAR HEARTBEAT: 0
SHORTNESS OF BREATH: 0
FLANK PAIN: 0
WEIGHT LOSS: 0
SYNCOPE: 0
CLAUDICATION: 0
PALPITATIONS: 0
DYSPNEA ON EXERTION: 0
BACK PAIN: 0
BLURRED VISION: 0
CONSTIPATION: 0
FEVER: 0
DEPRESSION: 0
DIARRHEA: 0
PND: 0
HEARTBURN: 0
ABDOMINAL PAIN: 0

## 2024-11-08 SDOH — HEALTH STABILITY: PHYSICAL HEALTH: ON AVERAGE, HOW MANY MINUTES DO YOU ENGAGE IN EXERCISE AT THIS LEVEL?: 70 MIN

## 2024-11-08 SDOH — ECONOMIC STABILITY: INCOME INSECURITY: IN THE LAST 12 MONTHS, WAS THERE A TIME WHEN YOU WERE NOT ABLE TO PAY THE MORTGAGE OR RENT ON TIME?: NO

## 2024-11-08 SDOH — ECONOMIC STABILITY: FOOD INSECURITY: WITHIN THE PAST 12 MONTHS, YOU WORRIED THAT YOUR FOOD WOULD RUN OUT BEFORE YOU GOT MONEY TO BUY MORE.: NEVER TRUE

## 2024-11-08 SDOH — HEALTH STABILITY: PHYSICAL HEALTH: ON AVERAGE, HOW MANY DAYS PER WEEK DO YOU ENGAGE IN MODERATE TO STRENUOUS EXERCISE (LIKE A BRISK WALK)?: 3 DAYS

## 2024-11-08 SDOH — ECONOMIC STABILITY: FOOD INSECURITY: WITHIN THE PAST 12 MONTHS, THE FOOD YOU BOUGHT JUST DIDN'T LAST AND YOU DIDN'T HAVE MONEY TO GET MORE.: NEVER TRUE

## 2024-11-08 SDOH — ECONOMIC STABILITY: INCOME INSECURITY: HOW HARD IS IT FOR YOU TO PAY FOR THE VERY BASICS LIKE FOOD, HOUSING, MEDICAL CARE, AND HEATING?: NOT HARD AT ALL

## 2024-11-08 ASSESSMENT — LIFESTYLE VARIABLES
HOW OFTEN DO YOU HAVE A DRINK CONTAINING ALCOHOL: 2-4 TIMES A MONTH
SKIP TO QUESTIONS 9-10: 1
AUDIT-C TOTAL SCORE: 2
HOW OFTEN DO YOU HAVE SIX OR MORE DRINKS ON ONE OCCASION: NEVER
HOW MANY STANDARD DRINKS CONTAINING ALCOHOL DO YOU HAVE ON A TYPICAL DAY: 1 OR 2

## 2024-11-08 ASSESSMENT — SOCIAL DETERMINANTS OF HEALTH (SDOH)
HOW OFTEN DO YOU GET TOGETHER WITH FRIENDS OR RELATIVES?: ONCE A WEEK
DO YOU BELONG TO ANY CLUBS OR ORGANIZATIONS SUCH AS CHURCH GROUPS UNIONS, FRATERNAL OR ATHLETIC GROUPS, OR SCHOOL GROUPS?: YES
HOW HARD IS IT FOR YOU TO PAY FOR THE VERY BASICS LIKE FOOD, HOUSING, MEDICAL CARE, AND HEATING?: NOT HARD AT ALL
HOW OFTEN DO YOU ATTEND CHURCH OR RELIGIOUS SERVICES?: MORE THAN 4 TIMES PER YEAR
IN THE PAST 12 MONTHS, HAS THE ELECTRIC, GAS, OIL, OR WATER COMPANY THREATENED TO SHUT OFF SERVICE IN YOUR HOME?: NO
HOW OFTEN DO YOU HAVE A DRINK CONTAINING ALCOHOL: 2-4 TIMES A MONTH
HOW OFTEN DO YOU ATTENT MEETINGS OF THE CLUB OR ORGANIZATION YOU BELONG TO?: MORE THAN 4 TIMES PER YEAR
IN A TYPICAL WEEK, HOW MANY TIMES DO YOU TALK ON THE PHONE WITH FAMILY, FRIENDS, OR NEIGHBORS?: TWICE A WEEK
HOW MANY DRINKS CONTAINING ALCOHOL DO YOU HAVE ON A TYPICAL DAY WHEN YOU ARE DRINKING: 1 OR 2
DO YOU BELONG TO ANY CLUBS OR ORGANIZATIONS SUCH AS CHURCH GROUPS UNIONS, FRATERNAL OR ATHLETIC GROUPS, OR SCHOOL GROUPS?: YES
IN A TYPICAL WEEK, HOW MANY TIMES DO YOU TALK ON THE PHONE WITH FAMILY, FRIENDS, OR NEIGHBORS?: TWICE A WEEK
HOW OFTEN DO YOU ATTEND CHURCH OR RELIGIOUS SERVICES?: MORE THAN 4 TIMES PER YEAR
WITHIN THE PAST 12 MONTHS, YOU WORRIED THAT YOUR FOOD WOULD RUN OUT BEFORE YOU GOT THE MONEY TO BUY MORE: NEVER TRUE
HOW OFTEN DO YOU GET TOGETHER WITH FRIENDS OR RELATIVES?: ONCE A WEEK
HOW OFTEN DO YOU HAVE SIX OR MORE DRINKS ON ONE OCCASION: NEVER
HOW OFTEN DO YOU ATTENT MEETINGS OF THE CLUB OR ORGANIZATION YOU BELONG TO?: MORE THAN 4 TIMES PER YEAR

## 2024-11-11 ENCOUNTER — OFFICE VISIT (OUTPATIENT)
Dept: MEDICAL GROUP | Facility: PHYSICIAN GROUP | Age: 74
End: 2024-11-11
Payer: MEDICARE

## 2024-11-11 VITALS
OXYGEN SATURATION: 94 % | SYSTOLIC BLOOD PRESSURE: 126 MMHG | WEIGHT: 244 LBS | HEIGHT: 74 IN | BODY MASS INDEX: 31.32 KG/M2 | TEMPERATURE: 97.2 F | HEART RATE: 68 BPM | DIASTOLIC BLOOD PRESSURE: 80 MMHG

## 2024-11-11 DIAGNOSIS — Z23 NEED FOR VACCINATION: ICD-10-CM

## 2024-11-11 DIAGNOSIS — I49.3 PVC (PREMATURE VENTRICULAR CONTRACTION): ICD-10-CM

## 2024-11-11 DIAGNOSIS — I47.10 PAROXYSMAL SVT (SUPRAVENTRICULAR TACHYCARDIA) (HCC): ICD-10-CM

## 2024-11-11 DIAGNOSIS — E78.00 ELEVATED LDL CHOLESTEROL LEVEL: ICD-10-CM

## 2024-11-11 DIAGNOSIS — M22.40 CHONDROMALACIA OF PATELLA, UNSPECIFIED LATERALITY: ICD-10-CM

## 2024-11-11 DIAGNOSIS — Z00.00 ENCOUNTER FOR MEDICARE ANNUAL WELLNESS EXAM: ICD-10-CM

## 2024-11-11 DIAGNOSIS — R79.89 ABNORMAL CBC: ICD-10-CM

## 2024-11-11 DIAGNOSIS — M54.50 LUMBAR PAIN: ICD-10-CM

## 2024-11-11 DIAGNOSIS — C44.310 BASAL CELL CARCINOMA (BCC) OF FACE: ICD-10-CM

## 2024-11-11 PROBLEM — R55 SYNCOPE: Status: RESOLVED | Noted: 2023-10-03 | Resolved: 2024-11-11

## 2024-11-11 PROCEDURE — 3074F SYST BP LT 130 MM HG: CPT | Performed by: STUDENT IN AN ORGANIZED HEALTH CARE EDUCATION/TRAINING PROGRAM

## 2024-11-11 PROCEDURE — 3079F DIAST BP 80-89 MM HG: CPT | Performed by: STUDENT IN AN ORGANIZED HEALTH CARE EDUCATION/TRAINING PROGRAM

## 2024-11-11 PROCEDURE — G0439 PPPS, SUBSEQ VISIT: HCPCS | Mod: 25 | Performed by: STUDENT IN AN ORGANIZED HEALTH CARE EDUCATION/TRAINING PROGRAM

## 2024-11-11 PROCEDURE — 90662 IIV NO PRSV INCREASED AG IM: CPT

## 2024-11-11 PROCEDURE — G0008 ADMIN INFLUENZA VIRUS VAC: HCPCS

## 2024-11-11 ASSESSMENT — PATIENT HEALTH QUESTIONNAIRE - PHQ9: CLINICAL INTERPRETATION OF PHQ2 SCORE: 0

## 2024-11-11 ASSESSMENT — ENCOUNTER SYMPTOMS: GENERAL WELL-BEING: GOOD

## 2024-11-11 ASSESSMENT — ACTIVITIES OF DAILY LIVING (ADL): BATHING_REQUIRES_ASSISTANCE: 0

## 2024-11-11 NOTE — PROGRESS NOTES
Chief Complaint   Patient presents with    Annual Exam       HPI:  Eric Santana is a 74 y.o. here for Medicare Annual Wellness Visit     Patient Active Problem List    Diagnosis Date Noted    PVC (premature ventricular contraction) 02/01/2024    Basal cell carcinoma (BCC) of face 11/07/2023    Chondromalacia of patella 11/07/2023    Paroxysmal SVT (supraventricular tachycardia) (HCC) 11/07/2023       Current Outpatient Medications   Medication Sig Dispense Refill    metoprolol SR (TOPROL XL) 25 MG TABLET SR 24 HR Take 1 Tablet by mouth every day. 100 Tablet 3    MegaRed Omega-3 Krill Oil 500 MG Cap Take 2 Capsules by mouth at bedtime.       No current facility-administered medications for this visit.          Current supplements as per medication list.     Allergies: Patient has no known allergies.    Current social contact/activities: Patient reports that he recently visited his grandkids. He reports staying at home.     He  reports that he has never smoked. He has never used smokeless tobacco. He reports current alcohol use of about 2.4 oz of alcohol per week. He reports that he does not use drugs.  Counseling given: Not Answered      ROS:    Gait: Uses no assistive device  Ostomy: No  Other tubes: No  Amputations: No  Chronic oxygen use: No  Last eye exam: 2024  Wears hearing aids: No   : Denies any urinary leakage during the last 6 months    Screening:    Depression Screening  Little interest or pleasure in doing things?  0 - not at all  Feeling down, depressed , or hopeless? 0 - not at all  Trouble falling or staying asleep, or sleeping too much?     Feeling tired or having little energy?     Poor appetite or overeating?     Feeling bad about yourself - or that you are a failure or have let yourself or your family down?    Trouble concentrating on things, such as reading the newspaper or watching television?    Moving or speaking so slowly that other people could have noticed.  Or the opposite - being so  fidgety or restless that you have been moving around a lot more than usual?     Thoughts that you would be better off dead, or of hurting yourself?     Patient Health Questionnaire Score:      If depressive symptoms identified deferred to follow up visit unless specifically addressed in assessment and plan.    Interpretation of PHQ-9 Total Score   Score Severity   1-4 No Depression   5-9 Mild Depression   10-14 Moderate Depression   15-19 Moderately Severe Depression   20-27 Severe Depression    Screening for Cognitive Impairment  Do you or any of your friends or family members have any concern about your memory? No  Three Minute Recall (Leader, Season, Table) 3/3    Huan clock face with all 12 numbers and set the hands to show 10 minutes after 11.  Yes    Cognitive concerns identified deferred for follow up unless specifically addressed in assessment and plan.    Fall Risk Assessment  Has the patient had two or more falls in the last year or any fall with injury in the last year?  No    Safety Assessment  Do you always wear your seatbelt?  Yes  Any changes to home needed to function safely? No  Difficulty hearing.  Yes  Patient counseled about all safety risks that were identified.    Functional Assessment ADLs  Are there any barriers preventing you from cooking for yourself or meeting nutritional needs?  No.    Are there any barriers preventing you from driving safely or obtaining transportation?  No.    Are there any barriers preventing you from using a telephone or calling for help?  No    Are there any barriers preventing you from shopping?  No.    Are there any barriers preventing you from taking care of your own finances?  No    Are there any barriers preventing you from managing your medications?  No    Are there any barriers preventing you from showering, bathing or dressing yourself? No    Are there any barriers preventing you from doing housework or laundry? No    Are there any barriers preventing you  from using the toilet?No    Are you currently engaging in any exercise or physical activity?  Yes.      Self-Assessment of Health  What is your perception of your health? Good    Do you sleep more than six hours a night? Yes    In the past 7 days, how much did pain keep you from doing your normal work? Some    Do you spend quality time with family or friends (virtually or in person)? Yes    Do you usually eat a heart healthy diet that constists of a variety of fruits, vegetables, whole grains and fiber? Yes    Do you eat foods high in fat and/or Fast Food more than three times per week? No    How concerned are you that your medical conditions are not being well managed? Not at all    Are you worried that in the next 2 months, you may not have stable housing that you own, rent, or stay in as part of a household? No        Advance Care Planning  Do you have an Advance Directive, Living Will, Durable Power of , or POLST? Yes          is on file      Health Maintenance Summary            Overdue - COVID-19 Vaccine (4 - 2024-25 season) Overdue since 9/1/2024 08/17/2023  Imm Admin: MODERNA BIVALENT BOOSTER SARS-COV-2 VACCINE (6+)    10/26/2021  Imm Admin: MODERNA SARS-COV-2 VACCINE (12+)    03/18/2021  Imm Admin: MODERNA SARS-COV-2 VACCINE (12+)    02/18/2021  Imm Admin: MODERNA SARS-COV-2 VACCINE (12+)              Annual Wellness Visit (Yearly) Next due on 11/11/2025 11/11/2024  Visit Dx: Encounter for Medicare annual wellness exam    11/07/2023  Level of Service: ANNUAL WELLNESS VISIT-INCLUDES PPPS SUBSEQUE*              Colorectal Cancer Screening (Colonoscopy - Every 5 Years) Next due on 3/15/2029      03/15/2024  Colonoscopy (Done - Repeat Five Years)    12/12/2023  COLOGUARD COLON CANCER SCREENING              IMM DTaP/Tdap/Td Vaccine (3 - Td or Tdap) Next due on 11/2/2029 11/02/2019  Imm Admin: Tdap Vaccine    11/10/2009  Imm Admin: Tdap Vaccine              Pneumococcal Vaccine: 65+ Years  (Series Information) Completed      2019  Imm Admin: Pneumococcal polysaccharide vaccine (PPSV-23)    10/02/2017  Imm Admin: Pneumococcal Conjugate Vaccine (Prevnar/PCV-13)              Zoster (Shingles) Vaccines (Series Information) Completed      2023  Imm Admin: Zoster Vaccine Recombinant (RZV) (SHINGRIX)    2023  Imm Admin: Zoster Vaccine Recombinant (RZV) (SHINGRIX)    2015  Imm Admin: Zoster Vaccine Live (ZVL) (Zostavax) - HISTORICAL DATA              Hepatitis C Screening  Completed      11/10/2023  Hepatitis C Antibody component of HCV Scrn ( 3332-4616 1xLife)              Influenza Vaccine (Series Information) Completed      2024  Imm Admin: Influenza high-dose trivalent (PF)    10/06/2023  Imm Admin: Influenza Vaccine Adult HD    10/13/2022  Imm Admin: Influenza Vaccine Adult HD    10/20/2021  Imm Admin: Influenza, unspecified formulation    10/19/2021  Imm Admin: Influenza Vaccine Adult HD    Only the first 5 history entries have been loaded, but more history exists.              Hepatitis A Vaccine (Hep A) (Series Information) Aged Out      No completion history exists for this topic.              Hepatitis B Vaccine (Hep B) (Series Information) Aged Out      No completion history exists for this topic.              HPV Vaccines (Series Information) Aged Out      No completion history exists for this topic.              Polio Vaccine (Inactivated Polio) (Series Information) Aged Out      No completion history exists for this topic.              Meningococcal Immunization (Series Information) Aged Out      No completion history exists for this topic.                    Patient Care Team:  Sera Burton M.D. as PCP - General (Family Medicine)      Social History     Tobacco Use    Smoking status: Never    Smokeless tobacco: Never   Vaping Use    Vaping status: Never Used   Substance Use Topics    Alcohol use: Yes     Alcohol/week: 2.4 oz     Types: 2 Cans of beer, 2 Standard  "drinks or equivalent per week     Comment: socially    Drug use: No     Family History   Problem Relation Age of Onset    Cancer Mother     Other Father         Cirrohis    Diabetes Paternal Grandfather     No Known Problems Daughter     No Known Problems Son      He  has a past medical history of Arthritis, Dental disorder, Pain (11/2019), Snoring, and Syncope (10/03/2023).   Past Surgical History:   Procedure Laterality Date    TENDON REPAIR Right 11/15/2019    Procedure: REPAIR, TENDON - FOR KNEE OPEN QUADRICEPS TENDON REPAIR AND FERNANDEZ;  Surgeon: Beka Marr M.D.;  Location: [facility];  Service: Orthopedics    SHOULDER DECOMPRESSION ARTHROSCOPIC Left 6/30/2015    Procedure: SHOULDER DECOMPRESSION ARTHROSCOPIC;  Surgeon: Christa Dela Cruz M.D.;  Location: [facility];  Service:     CLAVICLE DISTAL EXCISION Left 6/30/2015    Procedure: CLAVICLE DISTAL EXCISION;  Surgeon: Christa Dela Cruz M.D.;  Location: [facility];  Service:     SHOULDER ARTHROSCOPY W/ ROTATOR CUFF REPAIR Left 6/30/2015    Procedure: SHOULDER ARTHROSCOPY W/ ROTATOR CUFF REPAIR /POSSIBLE;  Surgeon: Christa Dela Cruz M.D.;  Location: [facility];  Service:     BLOCK EPIDURAL STEROID INJECTION  2014    UMBILICAL HERNIA REPAIR  2007    ORIF, FRACTURE, ULNA Right     and radius       Exam:   /80 (BP Location: Left arm, Patient Position: Sitting, BP Cuff Size: Adult)   Pulse 68   Temp 36.2 °C (97.2 °F)   Ht 1.88 m (6' 2\")   Wt 111 kg (244 lb)   SpO2 94%  Body mass index is 31.33 kg/m².    Hearing good.    Dentition good  Alert, oriented in no acute distress.  Eye contact is good, speech goal directed, affect calm    Assessment and Plan. The following treatment and monitoring plan is recommended:      1. Encounter for Medicare annual wellness exam  Medicare AWV done today.  Patient is healthy with no major concerns.  Patient is able to perform ADLs without difficulty.  Follows " up with dentist and ophthalmology regularly. Patient denies falls.    2. PVC (premature ventricular contraction)  3. Paroxysmal SVT (supraventricular tachycardia) (HCC)  Chronic, stable.  Patient is established with cardiology.  Continue metoprolol 25 mg daily.  - Comp Metabolic Panel; Future    4. Basal cell carcinoma (BCC) of face  Chronic, stable.  Patient is established with dermatology.    5. Chondromalacia of patella, unspecified laterality  Chronic, stable.    6. Abnormal CBC  - CBC WITH DIFFERENTIAL; Future    7. Elevated LDL cholesterol level  - Lipid Profile; Future    8. Lumbar pain  Chronic, stable.  - DX-LUMBAR SPINE-2 OR 3 VIEWS; Future  - DX-THORACIC SPINE-2 VIEWS; Future    9. Need for vaccination  - INFLUENZA VACCINE, HIGH DOSE (65+ ONLY)      Services suggested: No services needed at this time  Health Care Screening: Age-appropriate preventive services recommended by USPTF and ACIP covered by Medicare were discussed today. Services ordered if indicated and agreed upon by the patient.  Referrals offered: Community-based lifestyle interventions to reduce health risks and promote self-management and wellness, fall prevention, nutrition, physical activity, tobacco-use cessation, weight loss, and mental health services as per orders if indicated.    Discussion today about general wellness and lifestyle habits:    Prevent falls and reduce trip hazards; Cautioned about securing or removing rugs.  Have a working fire alarm and carbon monoxide detector;   Engage in regular physical activity and social activities     Follow-up: Return in about 6 months (around 5/11/2025) for Chronic Conditions.

## 2024-11-13 ENCOUNTER — HOSPITAL ENCOUNTER (OUTPATIENT)
Dept: LAB | Facility: MEDICAL CENTER | Age: 74
End: 2024-11-13
Attending: STUDENT IN AN ORGANIZED HEALTH CARE EDUCATION/TRAINING PROGRAM
Payer: MEDICARE

## 2024-11-13 DIAGNOSIS — I47.10 PAROXYSMAL SVT (SUPRAVENTRICULAR TACHYCARDIA) (HCC): ICD-10-CM

## 2024-11-13 DIAGNOSIS — R79.89 ABNORMAL CBC: ICD-10-CM

## 2024-11-13 DIAGNOSIS — E78.00 ELEVATED LDL CHOLESTEROL LEVEL: ICD-10-CM

## 2024-11-13 LAB
ALBUMIN SERPL BCP-MCNC: 4.5 G/DL (ref 3.2–4.9)
ALBUMIN/GLOB SERPL: 1.4 G/DL
ALP SERPL-CCNC: 64 U/L (ref 30–99)
ALT SERPL-CCNC: 18 U/L (ref 2–50)
ANION GAP SERPL CALC-SCNC: 9 MMOL/L (ref 7–16)
AST SERPL-CCNC: 23 U/L (ref 12–45)
BASOPHILS # BLD AUTO: 0.9 % (ref 0–1.8)
BASOPHILS # BLD: 0.05 K/UL (ref 0–0.12)
BILIRUB SERPL-MCNC: 0.8 MG/DL (ref 0.1–1.5)
BUN SERPL-MCNC: 13 MG/DL (ref 8–22)
CALCIUM ALBUM COR SERPL-MCNC: 9.3 MG/DL (ref 8.5–10.5)
CALCIUM SERPL-MCNC: 9.7 MG/DL (ref 8.5–10.5)
CHLORIDE SERPL-SCNC: 102 MMOL/L (ref 96–112)
CHOLEST SERPL-MCNC: 179 MG/DL (ref 100–199)
CO2 SERPL-SCNC: 26 MMOL/L (ref 20–33)
CREAT SERPL-MCNC: 1.09 MG/DL (ref 0.5–1.4)
EOSINOPHIL # BLD AUTO: 0.07 K/UL (ref 0–0.51)
EOSINOPHIL NFR BLD: 1.3 % (ref 0–6.9)
ERYTHROCYTE [DISTWIDTH] IN BLOOD BY AUTOMATED COUNT: 47.1 FL (ref 35.9–50)
FASTING STATUS PATIENT QL REPORTED: NORMAL
GFR SERPLBLD CREATININE-BSD FMLA CKD-EPI: 72 ML/MIN/1.73 M 2
GLOBULIN SER CALC-MCNC: 3.2 G/DL (ref 1.9–3.5)
GLUCOSE SERPL-MCNC: 98 MG/DL (ref 65–99)
HCT VFR BLD AUTO: 50.1 % (ref 42–52)
HDLC SERPL-MCNC: 53 MG/DL
HGB BLD-MCNC: 16 G/DL (ref 14–18)
IMM GRANULOCYTES # BLD AUTO: 0.01 K/UL (ref 0–0.11)
IMM GRANULOCYTES NFR BLD AUTO: 0.2 % (ref 0–0.9)
LDLC SERPL CALC-MCNC: 110 MG/DL
LYMPHOCYTES # BLD AUTO: 1.81 K/UL (ref 1–4.8)
LYMPHOCYTES NFR BLD: 32.6 % (ref 22–41)
MCH RBC QN AUTO: 29.4 PG (ref 27–33)
MCHC RBC AUTO-ENTMCNC: 31.9 G/DL (ref 32.3–36.5)
MCV RBC AUTO: 91.9 FL (ref 81.4–97.8)
MONOCYTES # BLD AUTO: 0.53 K/UL (ref 0–0.85)
MONOCYTES NFR BLD AUTO: 9.5 % (ref 0–13.4)
NEUTROPHILS # BLD AUTO: 3.08 K/UL (ref 1.82–7.42)
NEUTROPHILS NFR BLD: 55.5 % (ref 44–72)
NRBC # BLD AUTO: 0 K/UL
NRBC BLD-RTO: 0 /100 WBC (ref 0–0.2)
PLATELET # BLD AUTO: 177 K/UL (ref 164–446)
PMV BLD AUTO: 10.7 FL (ref 9–12.9)
POTASSIUM SERPL-SCNC: 4.4 MMOL/L (ref 3.6–5.5)
PROT SERPL-MCNC: 7.7 G/DL (ref 6–8.2)
RBC # BLD AUTO: 5.45 M/UL (ref 4.7–6.1)
SODIUM SERPL-SCNC: 137 MMOL/L (ref 135–145)
TRIGL SERPL-MCNC: 81 MG/DL (ref 0–149)
WBC # BLD AUTO: 5.6 K/UL (ref 4.8–10.8)

## 2024-11-13 PROCEDURE — 36415 COLL VENOUS BLD VENIPUNCTURE: CPT

## 2024-11-13 PROCEDURE — 85025 COMPLETE CBC W/AUTO DIFF WBC: CPT

## 2024-11-13 PROCEDURE — 80053 COMPREHEN METABOLIC PANEL: CPT

## 2024-11-13 PROCEDURE — 80061 LIPID PANEL: CPT

## 2024-11-19 ENCOUNTER — HOSPITAL ENCOUNTER (OUTPATIENT)
Dept: RADIOLOGY | Facility: MEDICAL CENTER | Age: 74
End: 2024-11-19
Attending: STUDENT IN AN ORGANIZED HEALTH CARE EDUCATION/TRAINING PROGRAM
Payer: MEDICARE

## 2024-11-19 DIAGNOSIS — M54.50 LUMBAR PAIN: ICD-10-CM

## 2024-11-19 PROCEDURE — 72070 X-RAY EXAM THORAC SPINE 2VWS: CPT

## 2024-11-19 PROCEDURE — 72100 X-RAY EXAM L-S SPINE 2/3 VWS: CPT

## 2025-03-04 ENCOUNTER — APPOINTMENT (OUTPATIENT)
Dept: MEDICAL GROUP | Facility: PHYSICIAN GROUP | Age: 75
End: 2025-03-04
Payer: MEDICARE

## 2025-03-04 VITALS
OXYGEN SATURATION: 96 % | TEMPERATURE: 98 F | BODY MASS INDEX: 30.93 KG/M2 | DIASTOLIC BLOOD PRESSURE: 64 MMHG | HEART RATE: 51 BPM | WEIGHT: 241 LBS | SYSTOLIC BLOOD PRESSURE: 120 MMHG | HEIGHT: 74 IN

## 2025-03-04 DIAGNOSIS — M51.369 DEGENERATION OF INTERVERTEBRAL DISC OF LUMBAR REGION WITHOUT DISCOGENIC BACK PAIN OR LOWER EXTREMITY PAIN: ICD-10-CM

## 2025-03-04 DIAGNOSIS — M51.34 DEGENERATIVE DISC DISEASE, THORACIC: ICD-10-CM

## 2025-03-04 PROCEDURE — 99213 OFFICE O/P EST LOW 20 MIN: CPT | Performed by: STUDENT IN AN ORGANIZED HEALTH CARE EDUCATION/TRAINING PROGRAM

## 2025-03-04 PROCEDURE — 3074F SYST BP LT 130 MM HG: CPT | Performed by: STUDENT IN AN ORGANIZED HEALTH CARE EDUCATION/TRAINING PROGRAM

## 2025-03-04 PROCEDURE — 3078F DIAST BP <80 MM HG: CPT | Performed by: STUDENT IN AN ORGANIZED HEALTH CARE EDUCATION/TRAINING PROGRAM

## 2025-03-04 RX ORDER — CYCLOBENZAPRINE HCL 10 MG
10 TABLET ORAL 3 TIMES DAILY PRN
Qty: 90 TABLET | Refills: 1 | Status: SHIPPED | OUTPATIENT
Start: 2025-03-04

## 2025-03-04 ASSESSMENT — ENCOUNTER SYMPTOMS
CHILLS: 0
FEVER: 0
PALPITATIONS: 0
SHORTNESS OF BREATH: 0

## 2025-03-04 ASSESSMENT — FIBROSIS 4 INDEX: FIB4 SCORE: 2.21

## 2025-03-04 ASSESSMENT — PATIENT HEALTH QUESTIONNAIRE - PHQ9: CLINICAL INTERPRETATION OF PHQ2 SCORE: 0

## 2025-03-04 NOTE — PROGRESS NOTES
Subjective:   Verbal consent was acquired by the patient to use [employer] ambient listening note generation during this visit Yes     CC: Review imaging results    History of Present Illness  Mr. Santana is a pleasant 74-year-old who presents today to review imaging of the spine.    He reports engaging in heavy lifting activities approximately 3 to 4 weeks ago, during which he experienced significant discomfort in his back. 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 This strenuous activity resulted in him being hunched over and having difficulty walking upon returning home in the afternoon. He also mentions that he was unable to access the pool during this period. He has been managing his symptoms with cyclobenzaprine, typically administered at night, and is seeking a refill of this medication. He maintains an active lifestyle, participating in deep water aerobics sessions three times a week.    Supplemental Information  He reports no current cardiac issues, with the last minor event occurring in December 2024. He also experiences stiffness in his fingers upon waking up in the morning, although he is uncertain if this is related to his diet.    MEDICATIONS  Current: Cyclobenzaprine.    Patient Active Problem List    Diagnosis Date Noted    Degeneration of intervertebral disc of lumbar region without discogenic back pain or lower extremity pain 03/04/2025    Degenerative disc disease, thoracic 03/04/2025    PVC (premature ventricular contraction) 02/01/2024    Basal cell carcinoma (BCC) of face 11/07/2023    Chondromalacia of patella 11/07/2023    Paroxysmal SVT (supraventricular tachycardia) (HCC) 11/07/2023             Health Maintenance: Completed    ROS:  Review of Systems   Constitutional:  Negative for chills and fever.   Respiratory:  Negative for shortness of breath.   "  Cardiovascular:  Negative for chest pain and palpitations.       Objective:     Exam:  /64 (BP Location: Right arm, Patient Position: Sitting, BP Cuff Size: Adult)   Pulse (!) 51   Temp 36.7 °C (98 °F) (Temporal)   Ht 1.88 m (6' 2\")   Wt 109 kg (241 lb)   SpO2 96%   BMI 30.94 kg/m²  Body mass index is 30.94 kg/m².    Physical Exam  Constitutional:       Appearance: Normal appearance.   Cardiovascular:      Rate and Rhythm: Normal rate and regular rhythm.   Pulmonary:      Effort: Pulmonary effort is normal. No respiratory distress.      Breath sounds: Normal breath sounds. No stridor. No wheezing or rhonchi.   Neurological:      Mental Status: He is alert.               Assessment & Plan:     74 y.o. male with the following -     1. Degeneration of intervertebral disc of lumbar region without discogenic back pain or lower extremity pain  2. Degenerative disc disease, thoracic  Chronic, ongoing.  Patient reports having chronic back pain.  Imaging from 11/2024 revealed multilevel neural degenerative changes in the thoracic and lumbar spine.  Patient was advised to continue physical activity such as swimming.  He can take ibuprofen/Tylenol as needed.  Patient verbalized understanding.  - cyclobenzaprine (FLEXERIL) 10 mg Tab; Take 1 Tablet by mouth 3 times a day as needed for Muscle Spasms.  Dispense: 90 Tablet; Refill: 1                  Return if symptoms worsen or fail to improve.    Please note that this dictation was created using voice recognition software. I have made every reasonable attempt to correct obvious errors, but I expect that there are errors of grammar and possibly content that I did not discover before finalizing the note.        "

## (undated) DEVICE — PACK LOWER EXTREMITY - (2/CA)

## (undated) DEVICE — PADDING CAST 6 IN STERILE - 6 X 4 YDS (24/CA)

## (undated) DEVICE — BANDAGE ELASTIC 6 IN X 5 YDS - LATEX FREE (10/BX)

## (undated) DEVICE — ELECTRODE DUAL RETURN W/ CORD - (50/PK)

## (undated) DEVICE — PAD PREP 24 X 48 CUFFED - (100/CA)

## (undated) DEVICE — GLOVE, LITE (PAIR)

## (undated) DEVICE — SODIUM CHL IRRIGATION 0.9% 1000ML (12EA/CA)

## (undated) DEVICE — DRESSING XEROFORM 1X8 - (50/BX 4BX/CA)

## (undated) DEVICE — SPONGE GAUZE STER 4X4 8-PL - (2/PK 50PK/BX 12BX/CS)

## (undated) DEVICE — LACTATED RINGERS INJ 1000 ML - (14EA/CA 60CA/PF)

## (undated) DEVICE — KIT DISPOSABLE HIP 2.8MM IMPLANT INCLUDES DRILL DRILL GUIDE AND OBTURATOR

## (undated) DEVICE — KIT ROOM DECONTAMINATION

## (undated) DEVICE — BOVIE NEEDLE TIP INSULATD NON-SAFETY 2CM (50/PK)

## (undated) DEVICE — BLOCK

## (undated) DEVICE — NEPTUNE 4 PORT MANIFOLD - (20/PK)

## (undated) DEVICE — BLADE SURGICAL #15 - (50/BX 3BX/CA)

## (undated) DEVICE — GLOVE BIOGEL SZ 8 SURGICAL PF LTX - (50PR/BX 4BX/CA)

## (undated) DEVICE — SUTURE GENERAL

## (undated) DEVICE — DRAPE LARGE 3 QUARTER - (20/CA)